# Patient Record
Sex: MALE | Race: WHITE | NOT HISPANIC OR LATINO
[De-identification: names, ages, dates, MRNs, and addresses within clinical notes are randomized per-mention and may not be internally consistent; named-entity substitution may affect disease eponyms.]

---

## 2018-02-16 ENCOUNTER — APPOINTMENT (OUTPATIENT)
Dept: PULMONOLOGY | Facility: CLINIC | Age: 73
End: 2018-02-16
Payer: MEDICARE

## 2018-02-16 VITALS
RESPIRATION RATE: 16 BRPM | TEMPERATURE: 98.1 F | SYSTOLIC BLOOD PRESSURE: 120 MMHG | DIASTOLIC BLOOD PRESSURE: 70 MMHG | HEART RATE: 75 BPM | OXYGEN SATURATION: 95 % | HEIGHT: 67 IN | BODY MASS INDEX: 25.11 KG/M2 | WEIGHT: 160 LBS

## 2018-02-16 DIAGNOSIS — Z00.00 ENCOUNTER FOR GENERAL ADULT MEDICAL EXAMINATION W/OUT ABNORMAL FINDINGS: ICD-10-CM

## 2018-02-16 PROCEDURE — 99205 OFFICE O/P NEW HI 60 MIN: CPT | Mod: 25

## 2018-02-16 PROCEDURE — 94726 PLETHYSMOGRAPHY LUNG VOLUMES: CPT

## 2018-02-16 PROCEDURE — 94060 EVALUATION OF WHEEZING: CPT

## 2018-02-16 PROCEDURE — ZZZZZ: CPT

## 2018-02-16 PROCEDURE — 94729 DIFFUSING CAPACITY: CPT

## 2018-02-16 RX ORDER — OMEPRAZOLE 20 MG/1
20 CAPSULE, DELAYED RELEASE ORAL
Qty: 90 | Refills: 0 | Status: ACTIVE | COMMUNITY
Start: 2017-09-28

## 2018-02-24 ENCOUNTER — OUTPATIENT (OUTPATIENT)
Dept: OUTPATIENT SERVICES | Facility: HOSPITAL | Age: 73
LOS: 1 days | End: 2018-02-24
Payer: MEDICARE

## 2018-02-24 ENCOUNTER — APPOINTMENT (OUTPATIENT)
Dept: CT IMAGING | Facility: IMAGING CENTER | Age: 73
End: 2018-02-24
Payer: MEDICARE

## 2018-02-24 DIAGNOSIS — Z00.8 ENCOUNTER FOR OTHER GENERAL EXAMINATION: ICD-10-CM

## 2018-02-24 PROCEDURE — 71250 CT THORAX DX C-: CPT | Mod: 26

## 2018-02-24 PROCEDURE — 71250 CT THORAX DX C-: CPT

## 2018-03-12 ENCOUNTER — APPOINTMENT (OUTPATIENT)
Dept: PULMONOLOGY | Facility: CLINIC | Age: 73
End: 2018-03-12
Payer: MEDICARE

## 2018-03-12 ENCOUNTER — MEDICATION RENEWAL (OUTPATIENT)
Age: 73
End: 2018-03-12

## 2018-03-12 VITALS
TEMPERATURE: 97.8 F | DIASTOLIC BLOOD PRESSURE: 80 MMHG | BODY MASS INDEX: 25.43 KG/M2 | WEIGHT: 162 LBS | HEART RATE: 69 BPM | HEIGHT: 67 IN | SYSTOLIC BLOOD PRESSURE: 130 MMHG | RESPIRATION RATE: 15 BRPM

## 2018-03-12 DIAGNOSIS — R91.8 OTHER NONSPECIFIC ABNORMAL FINDING OF LUNG FIELD: ICD-10-CM

## 2018-03-12 DIAGNOSIS — Z87.09 PERSONAL HISTORY OF OTHER DISEASES OF THE RESPIRATORY SYSTEM: ICD-10-CM

## 2018-03-12 PROCEDURE — 99215 OFFICE O/P EST HI 40 MIN: CPT

## 2018-03-20 ENCOUNTER — MOBILE ON CALL (OUTPATIENT)
Age: 73
End: 2018-03-20

## 2018-06-16 ENCOUNTER — APPOINTMENT (OUTPATIENT)
Dept: CT IMAGING | Facility: IMAGING CENTER | Age: 73
End: 2018-06-16
Payer: MEDICARE

## 2018-06-16 ENCOUNTER — OUTPATIENT (OUTPATIENT)
Dept: OUTPATIENT SERVICES | Facility: HOSPITAL | Age: 73
LOS: 1 days | End: 2018-06-16
Payer: MEDICARE

## 2018-06-16 DIAGNOSIS — R91.8 OTHER NONSPECIFIC ABNORMAL FINDING OF LUNG FIELD: ICD-10-CM

## 2018-06-16 PROCEDURE — 71250 CT THORAX DX C-: CPT

## 2018-06-16 PROCEDURE — 71250 CT THORAX DX C-: CPT | Mod: 26

## 2018-07-02 ENCOUNTER — APPOINTMENT (OUTPATIENT)
Dept: PULMONOLOGY | Facility: CLINIC | Age: 73
End: 2018-07-02

## 2018-10-01 ENCOUNTER — APPOINTMENT (OUTPATIENT)
Dept: PULMONOLOGY | Facility: CLINIC | Age: 73
End: 2018-10-01

## 2018-10-01 ENCOUNTER — APPOINTMENT (OUTPATIENT)
Dept: PULMONOLOGY | Facility: CLINIC | Age: 73
End: 2018-10-01
Payer: MEDICARE

## 2018-10-01 ENCOUNTER — MED ADMIN CHARGE (OUTPATIENT)
Age: 73
End: 2018-10-01

## 2018-10-01 PROCEDURE — 90662 IIV NO PRSV INCREASED AG IM: CPT

## 2018-10-01 PROCEDURE — G0008: CPT

## 2019-09-09 ENCOUNTER — OUTPATIENT (OUTPATIENT)
Dept: OUTPATIENT SERVICES | Facility: HOSPITAL | Age: 74
LOS: 1 days | End: 2019-09-09
Payer: MEDICARE

## 2019-09-09 ENCOUNTER — APPOINTMENT (OUTPATIENT)
Dept: RADIOLOGY | Facility: CLINIC | Age: 74
End: 2019-09-09
Payer: MEDICARE

## 2019-09-09 DIAGNOSIS — M47.817 SPONDYLOSIS WITHOUT MYELOPATHY OR RADICULOPATHY, LUMBOSACRAL REGION: ICD-10-CM

## 2019-09-09 PROCEDURE — 71100 X-RAY EXAM RIBS UNI 2 VIEWS: CPT | Mod: 26,RT

## 2019-09-09 PROCEDURE — 71100 X-RAY EXAM RIBS UNI 2 VIEWS: CPT

## 2020-10-15 ENCOUNTER — APPOINTMENT (OUTPATIENT)
Dept: PULMONOLOGY | Facility: CLINIC | Age: 75
End: 2020-10-15
Payer: MEDICARE

## 2020-10-15 DIAGNOSIS — Z23 ENCOUNTER FOR IMMUNIZATION: ICD-10-CM

## 2020-10-15 PROCEDURE — G0008: CPT

## 2020-10-15 PROCEDURE — 90662 IIV NO PRSV INCREASED AG IM: CPT

## 2021-05-19 ENCOUNTER — APPOINTMENT (OUTPATIENT)
Dept: ORTHOPEDIC SURGERY | Facility: CLINIC | Age: 76
End: 2021-05-19
Payer: MEDICARE

## 2021-05-19 VITALS — BODY MASS INDEX: 21.62 KG/M2 | HEIGHT: 70 IN | WEIGHT: 151 LBS

## 2021-05-19 DIAGNOSIS — Z78.9 OTHER SPECIFIED HEALTH STATUS: ICD-10-CM

## 2021-05-19 DIAGNOSIS — M48.07 SPINAL STENOSIS, LUMBOSACRAL REGION: ICD-10-CM

## 2021-05-19 PROCEDURE — 99204 OFFICE O/P NEW MOD 45 MIN: CPT

## 2021-05-27 ENCOUNTER — NON-APPOINTMENT (OUTPATIENT)
Age: 76
End: 2021-05-27

## 2021-05-27 ENCOUNTER — APPOINTMENT (OUTPATIENT)
Dept: ORTHOPEDIC SURGERY | Facility: CLINIC | Age: 76
End: 2021-05-27
Payer: MEDICARE

## 2021-05-27 VITALS
BODY MASS INDEX: 21.62 KG/M2 | WEIGHT: 151 LBS | HEIGHT: 70 IN | TEMPERATURE: 98.1 F | SYSTOLIC BLOOD PRESSURE: 143 MMHG | HEART RATE: 71 BPM | DIASTOLIC BLOOD PRESSURE: 82 MMHG

## 2021-05-27 DIAGNOSIS — Z82.61 FAMILY HISTORY OF ARTHRITIS: ICD-10-CM

## 2021-05-27 DIAGNOSIS — Z82.62 FAMILY HISTORY OF OSTEOPOROSIS: ICD-10-CM

## 2021-05-27 DIAGNOSIS — Z87.39 PERSONAL HISTORY OF OTHER DISEASES OF THE MUSCULOSKELETAL SYSTEM AND CONNECTIVE TISSUE: ICD-10-CM

## 2021-05-27 DIAGNOSIS — Z80.9 FAMILY HISTORY OF MALIGNANT NEOPLASM, UNSPECIFIED: ICD-10-CM

## 2021-05-27 PROCEDURE — 73522 X-RAY EXAM HIPS BI 3-4 VIEWS: CPT

## 2021-05-27 PROCEDURE — 99213 OFFICE O/P EST LOW 20 MIN: CPT

## 2021-06-01 ENCOUNTER — RESULT REVIEW (OUTPATIENT)
Age: 76
End: 2021-06-01

## 2021-06-01 ENCOUNTER — APPOINTMENT (OUTPATIENT)
Dept: ULTRASOUND IMAGING | Facility: CLINIC | Age: 76
End: 2021-06-01
Payer: MEDICARE

## 2021-06-01 ENCOUNTER — OUTPATIENT (OUTPATIENT)
Dept: OUTPATIENT SERVICES | Facility: HOSPITAL | Age: 76
LOS: 1 days | End: 2021-06-01
Payer: MEDICARE

## 2021-06-01 DIAGNOSIS — M16.0 BILATERAL PRIMARY OSTEOARTHRITIS OF HIP: ICD-10-CM

## 2021-06-01 PROCEDURE — 20611 DRAIN/INJ JOINT/BURSA W/US: CPT | Mod: LT

## 2021-06-01 PROCEDURE — 20611 DRAIN/INJ JOINT/BURSA W/US: CPT

## 2021-06-03 NOTE — REVIEW OF SYSTEMS
[Negative] : Heme/Lymph [FreeTextEntry9] : Joint pain  [FreeTextEntry2] : Feeling tired [de-identified] : Muscle weakness

## 2021-06-03 NOTE — HISTORY OF PRESENT ILLNESS
[8] : the relief from medicine is 8/10 [(no relief)  0] : the relief from medicine is 0/10 (no relief) [] : Yes [de-identified] : The patient comes in today for his bilateral hips.  He has been referred in for evaluation of his bilateral hips.  He states he has been having some long term back pain.  He did have an injection once in his left hip socket in 2019 with some relief.  He is having persistent complaints of pain but he does not want to proceed with any surgical intervention.  This injury is not work related or due to an automobile accident.  The patient states the pain is constant.  The patient describes the pain as sharp and throbbing.  The patient states nothing makes the pain better while walking, bending, sitting and lying down makes the pain worse.\par \par Pain level includes a current pain level of 9/10. \par \par Ailment Interference:  \par Daily Livin/10\par Normal Work:  0/10\par Sleep:  0/10\par Enjoyment of Life:  0/10\par Climbing Stairs:   0/10\par Sports:   0/10\par Extra-Curricular Activities:   0/10 [de-identified] : Physical therapy  [de-identified] : Steroids (hip) [de-identified] : Steroids (back) [de-identified] : He notes loss of balance.  He notes he fell twice (months apart).

## 2021-06-03 NOTE — ADDENDUM
[FreeTextEntry1] : This note was written by Saida Rosenbaum on 06/02/2021 acting as scribe for Wale Jacobs III, MD

## 2021-06-03 NOTE — PHYSICAL EXAM
[de-identified] : Left Hip: \par Range of Motion in Degrees:\par 	                                  Claimant:	Normal:	\par Flexion (Active) 	                  120 	                120-degrees	\par Flexion (Passive)	                  120	                120-degrees	\par Extension (Active)	                  -30	                -30-degrees	\par Extension (Passive)	  -30	                -30-degrees	\par Abduction (Active)	                  45-50	                01-33-brkfenl	\par Abduction (Passive)	  45-50	                74-66-imxnzoo	\par Adduction (Active)	                  20-30	                14-34-kbadssk	\par Adduction (Passive)	  20-30	                84-30-ikpupii	\par Internal Rotation (Active) 	 10	                35-degrees	\par Internal Rotation (Passive)	 10	                35-degrees	\par External Rotation (Active)	 45	                45-degrees	\par External Rotation (Passive)	 45	                45-degrees	\par \par Tenderness into the groin with internal and external rotation and axial load.  No tenderness to palpation over the greater trochanter.  Negative Trendelenburg.  No tenderness with resisted abduction.  No weakness to flexion, extension, abduction or adduction.  No evidence of instability.  No motor or sensory deficits.  2+ DP and PT pulses.  Skin is intact.  No scars, rashes or lesions.  \par  \par Right Hip: \par Range of Motion in Degrees:\par 	                                  Claimant:	Normal:	\par Flexion (Active) 	                  120 	                120-degrees	\par Flexion (Passive)	                  120	                120-degrees	\par Extension (Active)	                  -30	                -30-degrees	\par Extension (Passive)	  -30	                -30-degrees	\par Abduction (Active)	                  45-50	                81-91-vfclarq	\par Abduction (Passive)	  45-50	                36-18-eohruat	\par Adduction (Active)	                  20-30	                53-98-soyaujy	\par Adduction (Passive)	  20-30	                89-82-uakxcru	\par Internal Rotation (Active) 	 10	                35-degrees	\par Internal Rotation (Passive)	 10	                35-degrees	\par External Rotation (Active)	 45	                45-degrees	\par External Rotation (Passive)	 45	                45-degrees	\par \par Tenderness into the groin with internal and external rotation and axial load.  No tenderness to palpation over the greater trochanter.  Negative Trendelenburg.  No tenderness with resisted abduction.  No weakness to flexion, extension, abduction or adduction.  No evidence of instability.  No motor or sensory deficits.  2+ DP and PT pulses.  Skin is intact.  No scars, rashes or lesions.  \par  [de-identified] : Ambulating with a slightly antalgic to antalgic gait.  Station:  Normal.  [de-identified] : General Appearance:  Well-developed, well-nourished male in no acute distress. \par  [de-identified] : Radiographs, two views of the right hip, two views of the left hip and one view of the pelvis, show end stage arthritis of bilateral hips.

## 2021-06-03 NOTE — DISCUSSION/SUMMARY
[de-identified] : At this time, I had a long discussion with him concerning operative and nonoperative modalities for the bilateral hip osteoarthritis.  At this point, he does not want to proceed with any surgical intervention.  I have therefore offered to refer him in for bilateral intra-articular hip injections.  He will be reassessed once that is done.

## 2021-07-01 ENCOUNTER — APPOINTMENT (OUTPATIENT)
Dept: ORTHOPEDIC SURGERY | Facility: CLINIC | Age: 76
End: 2021-07-01
Payer: MEDICARE

## 2021-07-01 VITALS
HEIGHT: 70 IN | DIASTOLIC BLOOD PRESSURE: 79 MMHG | WEIGHT: 315 LBS | SYSTOLIC BLOOD PRESSURE: 126 MMHG | HEART RATE: 75 BPM | BODY MASS INDEX: 45.1 KG/M2

## 2021-07-01 PROCEDURE — 72170 X-RAY EXAM OF PELVIS: CPT

## 2021-07-01 PROCEDURE — 99213 OFFICE O/P EST LOW 20 MIN: CPT

## 2021-07-06 ENCOUNTER — RESULT REVIEW (OUTPATIENT)
Age: 76
End: 2021-07-06

## 2021-07-06 ENCOUNTER — OUTPATIENT (OUTPATIENT)
Dept: OUTPATIENT SERVICES | Facility: HOSPITAL | Age: 76
LOS: 1 days | End: 2021-07-06
Payer: MEDICARE

## 2021-07-06 ENCOUNTER — APPOINTMENT (OUTPATIENT)
Dept: ULTRASOUND IMAGING | Facility: CLINIC | Age: 76
End: 2021-07-06
Payer: MEDICARE

## 2021-07-06 DIAGNOSIS — Z00.8 ENCOUNTER FOR OTHER GENERAL EXAMINATION: ICD-10-CM

## 2021-07-06 PROCEDURE — 20611 DRAIN/INJ JOINT/BURSA W/US: CPT

## 2021-07-06 PROCEDURE — 20611 DRAIN/INJ JOINT/BURSA W/US: CPT | Mod: RT

## 2021-07-07 NOTE — ADDENDUM
[FreeTextEntry1] : This note was written by Raquel Raza on 07/07/2021 acting as a scribe for EDILBERTO CAMPA III, MD

## 2021-07-07 NOTE — PHYSICAL EXAM
[de-identified] : Left Hip: \par Range of Motion in Degrees:\par 	                                  Claimant:	Normal:	\par Flexion (Active) 	                  120 	                120-degrees	\par Flexion (Passive)	                  120	                120-degrees	\par Extension (Active)	                  -30	                -30-degrees	\par Extension (Passive)	  -30	                -30-degrees	\par Abduction (Active)	                  45-50	                65-57-oxbwvew	\par Abduction (Passive)	  45-50	                95-99-uffnhaz	\par Adduction (Active)	                  20-30	                76-82-oesibhc	\par Adduction (Passive)	  20-30	                50-18-ndpxxlu	\par Internal Rotation (Active) 	  0	                35-degrees	\par Internal Rotation (Passive)	  0	                35-degrees	\par External Rotation (Active)	 45	                45-degrees	\par External Rotation (Passive)	 45	                45-degrees	\par \par Minimal groin pain.  No anterior thigh pain. Tenderness into the groin with internal and external rotation and axial load.  No tenderness to palpation over the greater trochanter.  Negative Trendelenburg.  No tenderness with resisted abduction.  No weakness to flexion, extension, abduction or adduction.  No evidence of instability.  No motor or sensory deficits.  2+ DP and PT pulses.  Skin is intact.  No scars, rashes or lesions.  \par \par Right Hip: \par Range of Motion in Degrees:\par 	                                  Claimant:	Normal:	\par Flexion (Active) 	                  120 	                120-degrees	\par Flexion (Passive)	                  120	                120-degrees	\par Extension (Active)	                  -30	                -30-degrees	\par Extension (Passive)	  -30	                -30-degrees	\par Abduction (Active)	                  45-50	                80-37-yrqudvo	\par Abduction (Passive)	  45-50	                23-33-votblng	\par Adduction (Active)	                  20-30	                75-40-ttbgnqk	\par Adduction (Passive)	  20-30	                05-31-qiujlsr	\par Internal Rotation (Active) 	  0	                35-degrees	\par Internal Rotation (Passive)	  0	                35-degrees	\par External Rotation (Active)	 45	                45-degrees	\par External Rotation (Passive)	 45	                45-degrees	\par \par Pain into the groin.  No anterior thigh pain.  Tenderness into the groin with internal and external rotation and axial load.  No tenderness to palpation over the greater trochanter.  Negative Trendelenburg.  No tenderness with resisted abduction.  No weakness to flexion, extension, abduction or adduction.  No evidence of instability.  No motor or sensory deficits.  2+ DP and PT pulses.  Skin is intact.  No scars, rashes or lesions.  \par    [de-identified] : Ambulating with a slightly antalgic to antalgic gait.  Station:  Normal.  [de-identified] : Appearance:  Well-developed, well-nourished male in no acute distress.\par   [de-identified] : Radiographs, one view of the pelvis, show severe arthritis.

## 2021-07-07 NOTE — HISTORY OF PRESENT ILLNESS
[de-identified] : The patient comes in today for his bilateral hips.  He has seen Dr. Baldwin.  His only complaints right now is some groin pain on the right and some anterior thigh pain.

## 2021-07-07 NOTE — DISCUSSION/SUMMARY
[de-identified] : At this time, due to osteoarthritis of the bilateral hips and possible spinal stenosis vs. femoral radiculopathy, I recommend intra-articular injection on the right hip.  I advised him that ultimately he is going to need a hip arthroplasty, but he seems to have responded pretty well to the left intra-articular injection.  He will be reassessed in three to four weeks.  He will follow up with a spine surgeon.

## 2021-10-15 ENCOUNTER — APPOINTMENT (OUTPATIENT)
Dept: PULMONOLOGY | Facility: CLINIC | Age: 76
End: 2021-10-15
Payer: MEDICARE

## 2021-10-15 PROCEDURE — 90662 IIV NO PRSV INCREASED AG IM: CPT

## 2021-10-15 PROCEDURE — G0008: CPT

## 2021-11-03 ENCOUNTER — APPOINTMENT (OUTPATIENT)
Dept: CARDIOLOGY | Facility: CLINIC | Age: 76
End: 2021-11-03
Payer: MEDICARE

## 2021-11-03 VITALS
WEIGHT: 156 LBS | DIASTOLIC BLOOD PRESSURE: 81 MMHG | HEIGHT: 70 IN | OXYGEN SATURATION: 97 % | HEART RATE: 81 BPM | SYSTOLIC BLOOD PRESSURE: 142 MMHG | BODY MASS INDEX: 22.33 KG/M2

## 2021-11-03 DIAGNOSIS — M16.0 BILATERAL PRIMARY OSTEOARTHRITIS OF HIP: ICD-10-CM

## 2021-11-03 DIAGNOSIS — Z87.891 PERSONAL HISTORY OF NICOTINE DEPENDENCE: ICD-10-CM

## 2021-11-03 DIAGNOSIS — Z01.818 ENCOUNTER FOR OTHER PREPROCEDURAL EXAMINATION: ICD-10-CM

## 2021-11-03 PROCEDURE — 99204 OFFICE O/P NEW MOD 45 MIN: CPT

## 2021-11-04 ENCOUNTER — APPOINTMENT (OUTPATIENT)
Dept: CARDIOLOGY | Facility: CLINIC | Age: 76
End: 2021-11-04
Payer: MEDICARE

## 2021-11-04 PROCEDURE — 93306 TTE W/DOPPLER COMPLETE: CPT

## 2021-11-05 ENCOUNTER — APPOINTMENT (OUTPATIENT)
Dept: CARDIOLOGY | Facility: CLINIC | Age: 76
End: 2021-11-05
Payer: MEDICARE

## 2021-11-05 PROCEDURE — 93015 CV STRESS TEST SUPVJ I&R: CPT

## 2021-11-05 PROCEDURE — 78452 HT MUSCLE IMAGE SPECT MULT: CPT

## 2021-11-05 PROCEDURE — A9500: CPT

## 2021-11-08 NOTE — REASON FOR VISIT
[Arrhythmia/ECG Abnorrmalities] : arrhythmia/ECG abnormalities [Other: ____] : [unfilled] [FreeTextEntry1] : I saw Johnie Sutton in the office today for cardiac evaluation.  He is scheduled for left hip replacement surgery next week.  Preop ECG showed a new right bundle branch block compared to a tracing from 2012.  The patient has no known history of heart disease.  He had a cardiac work-up in the past he thinks about 10 years ago that was unremarkable.\par \par He denies any history of hypertension, diabetes, or hyperlipidemia.  He has no family history of heart disease.  He did smoke cigars but stopped in the late 1970s.  The for the past 2 years has been relatively sedentary because of his hip pain and now walks with a cane.  He has no chest pain, shortness of breath, palpitation.\par \par He is on no prescription medication and denies any history of prior surgery.  He does have chronic back pain will eventually require surgery on the lumbar spine.\par \par Preop ECG demonstrates sinus rhythm with a right bundle branch block and left axis deviation.

## 2021-11-08 NOTE — ADDENDUM
[FreeTextEntry1] : Chemical nuclear stress test 11/5/21 was normal without ischemia. EF 58%.  Echocardiogram 11/4/21 demonstrated an EF 62%. LVH, stage I diastolic dysfunction. Mlid MR.\par \par His cardiac status is stable. No cardiac contraindications to planned hip replacement surgery.

## 2021-11-08 NOTE — REVIEW OF SYSTEMS
[Feeling Fatigued] : feeling fatigued [Constipation] : constipation [Joint Pain] : joint pain [Hip Pain] : hip pain [Lower Back Pain] : lower back pain [Negative] : Genitourinary [Rash] : no rash [Dizziness] : no dizziness [Depression] : no depression [Anxiety] : no anxiety [Easy Bleeding] : no tendency for easy bleeding [Easy Bruising] : no tendency for easy bruising

## 2021-11-08 NOTE — DISCUSSION/SUMMARY
[FreeTextEntry1] : This is a 76-year-old white male with no significant risk factors for heart disease other than a remote history of cigar smoking.  He is on no prescription medication, and has had no prior surgery.  He is relatively sedentary and has difficulty walking because of bilateral hip arthritis and lower lumbar spine pain.  He denies any chest pain, or shortness of breath.\par \par Preop ECG demonstrates a right bundle branch block which is new compared to a tracing from 2012.  Most likely this is due to aging and is an electrical blockage.  Prior to surgery the patient needs an echocardiogram to make sure there is no structural heart disease and a stress test to determine any underlying coronary disease.  Since he cannot walk we would do a chemical nuclear stress test.\par \par These tests will be scheduled to soon as possible.  Assuming the tests show no significant problem the patient represents a satisfactory candidate for the planned hip replacement surgery.  No special precautions other than routine hemodynamic monitoring should be required.  Will notify your office as soon as these tests are completed and the results are available.\par \par This was discussed with the patient and his wife, and I answered all of his questions.

## 2022-05-17 ENCOUNTER — APPOINTMENT (OUTPATIENT)
Dept: ORTHOPEDIC SURGERY | Facility: CLINIC | Age: 77
End: 2022-05-17
Payer: MEDICARE

## 2022-05-17 VITALS — HEIGHT: 70 IN | WEIGHT: 150 LBS | BODY MASS INDEX: 21.47 KG/M2

## 2022-05-17 PROCEDURE — 99213 OFFICE O/P EST LOW 20 MIN: CPT

## 2022-05-17 PROCEDURE — 73521 X-RAY EXAM HIPS BI 2 VIEWS: CPT | Mod: FY

## 2022-05-17 NOTE — HISTORY OF PRESENT ILLNESS
[de-identified] : Patient reports sleeping well, reports infrequent sorneness over left hip. Reports lower back pain and problems that is limiting.

## 2022-05-17 NOTE — ASSESSMENT
[FreeTextEntry1] : Discussion was had in regards to HEP that will help with lower back pain, hamstring tightness and hip mobility. Patient is scheduled for right total hip and will follow up accordingly after surgery. \par \par

## 2022-05-17 NOTE — PHYSICAL EXAM
[Normal Mood and Affect] : normal mood and affect [Orientated] : orientated [Able to Communicate] : able to communicate [Well Nourished] : well nourished [5___] : adduction 5[unfilled]/5 [Left] : left hip with pelvis [All Views] : anteroposterior, lateral [Components well fixed, in good position] : Components well fixed, in good position [Right] : right hip with pelvis [AP] : anteroposterior [Lateral] : lateral [Severe arthritis (Tonnis Grade 3)] : Severe arthritis (Tonnis Grade 3) [] : no palpable masses [FreeTextEntry3] : Flex 100*, IR 40* EXT 50* ABD 50*  [FreeTextEntry8] : Flex 90*, IR 30* EXT 40* ABD 30*\par  [de-identified] : L [FreeTextEntry9] : Bone on Bone superolaterally, osteophytes, cysts in femoral head and sclerosis

## 2022-05-17 NOTE — REASON FOR VISIT
[FreeTextEntry2] : here today for f/u left ARY 11/10/21.  doing well no complaints.   patient is scheduled for 6/22/22 right ARY  c/o right hip getting worse\par

## 2022-06-14 ENCOUNTER — APPOINTMENT (OUTPATIENT)
Dept: CARDIOLOGY | Facility: CLINIC | Age: 77
End: 2022-06-14
Payer: MEDICARE

## 2022-06-14 VITALS
OXYGEN SATURATION: 98 % | WEIGHT: 154 LBS | SYSTOLIC BLOOD PRESSURE: 132 MMHG | DIASTOLIC BLOOD PRESSURE: 82 MMHG | BODY MASS INDEX: 22.05 KG/M2 | HEIGHT: 70 IN | HEART RATE: 69 BPM

## 2022-06-14 PROCEDURE — 99214 OFFICE O/P EST MOD 30 MIN: CPT

## 2022-06-14 NOTE — DISCUSSION/SUMMARY
[FreeTextEntry1] : Clinically the patient is doing well.  He has good functional status without chest pain, shortness of breath, palpitation.  Does have a right bundle branch block.  Underwent cardiac evaluation 11/21 with a normal chemical nuclear stress test and echocardiogram that is consistent with his age.  He has normal ejection fraction without any significant valvular heart disease.\par \par The patient's cardiac status is stable and he represents a satisfactory candidate for the planned right hip replacement surgery.  No special precautions other than routine hemodynamic monitoring should be required.  I will get a copy of his presurgical records from [facility].

## 2022-06-14 NOTE — PHYSICAL EXAM
[Not Palpable] : not palpable [Normal Rate] : normal [Normal S1] : normal S1 [Normal S2] : normal S2 [No Murmur] : no murmurs heard [No Pitting Edema] : no pitting edema present [2+] : left 2+ [1+] : left 1+ [No Abnormalities] : the abdominal aorta was not enlarged and no bruit was heard [Normal] : clear lung fields, good air entry, no respiratory distress [Soft] : abdomen soft [Non Tender] : non-tender [No Edema] : no edema [No Rash] : no rash [Moves all extremities] : moves all extremities [No Focal Deficits] : no focal deficits [Normal Speech] : normal speech [Alert and Oriented] : alert and oriented [Normal memory] : normal memory [S3] : no S3 [S4] : no S4 [Right Carotid Bruit] : no bruit heard over the right carotid [Left Carotid Bruit] : no bruit heard over the left carotid [Right Femoral Bruit] : no bruit heard over the right femoral artery [Left Femoral Bruit] : no bruit heard over the left femoral artery [de-identified] : Walks with a cane

## 2022-06-14 NOTE — HISTORY OF PRESENT ILLNESS
[FreeTextEntry1] : I saw Johnie Sutton in the office today for cardiac follow up' in anticipation of right hip replacement surgery.  I had seen him previously prior to his left hip replacement which went well..  He  a new right bundle branch block noted on ECG compared to 2012..  The patient has no known history of heart disease.  He had a cardiac work-up in the past he thinks about 10 years ago that was unremarkable.\par \par He underwent a chemical nuclear stress test 11/21 that showed no ischemia.  EF 68%.  Echocardiogram demonstrated ejection fraction of 62%.  There was mild TR without pulmonary hypertension.  LVH with stage I diastolic dysfunction.\par \par He denies any history of hypertension, diabetes, or hyperlipidemia.  He has no family history of heart disease.  He did smoke cigars but stopped in the late 1970s.  The for the past 2 years has been relatively sedentary because of his hip pain.  Since his left hip replacement he is walking better and  has no chest pain, shortness of breath, palpitation.\par

## 2022-06-14 NOTE — CARDIOLOGY SUMMARY
[de-identified] : 11/21-RSR, RBBB, LAD [de-identified] : 11/21-Chem- no ischemia, EF 68% [de-identified] : 11/21  EF 62%, mild TR, PAP=26, LVH, I DD

## 2022-06-22 ENCOUNTER — APPOINTMENT (OUTPATIENT)
Dept: ORTHOPEDIC SURGERY | Facility: HOSPITAL | Age: 77
End: 2022-06-22
Payer: MEDICARE

## 2022-06-22 PROCEDURE — 27130 TOTAL HIP ARTHROPLASTY: CPT | Mod: AS,RT

## 2022-06-22 PROCEDURE — 27130 TOTAL HIP ARTHROPLASTY: CPT

## 2022-07-05 ENCOUNTER — APPOINTMENT (OUTPATIENT)
Dept: ORTHOPEDIC SURGERY | Facility: CLINIC | Age: 77
End: 2022-07-05

## 2022-07-05 DIAGNOSIS — Z96.642 PRESENCE OF LEFT ARTIFICIAL HIP JOINT: ICD-10-CM

## 2022-07-05 PROCEDURE — 73502 X-RAY EXAM HIP UNI 2-3 VIEWS: CPT | Mod: RT

## 2022-07-05 PROCEDURE — 99024 POSTOP FOLLOW-UP VISIT: CPT

## 2022-07-05 NOTE — HISTORY OF PRESENT ILLNESS
[de-identified] : Patient reports sleeping well, reports infrequent sorneness over left hip. Reports lower back pain and problems that is limiting.

## 2022-07-05 NOTE — REASON FOR VISIT
[FreeTextEntry2] : Patient had right hip replaced 06/22/2022. Patient is taking hydrocodone. Patient admits pain is 4/10. Fell on Sunday, experiencing some pain. Difficulty sleeping.

## 2022-07-05 NOTE — PHYSICAL EXAM
[Normal Mood and Affect] : normal mood and affect [Orientated] : orientated [Able to Communicate] : able to communicate [Well Nourished] : well nourished [Left] : left hip [All Views] : anteroposterior, lateral [Components well fixed, in good position] : Components well fixed, in good position [AP] : anteroposterior [Lateral] : lateral [Severe arthritis (Tonnis Grade 3)] : Severe arthritis (Tonnis Grade 3) [5___] : adduction 5[unfilled]/5 [] : uses walker [Right] : right hip with pelvis [FreeTextEntry8] : Flex 90*, IR 30* EXT 40* ABD 30*\par  [FreeTextEntry9] : Bone on Bone superolaterally, osteophytes, cysts in femoral head and sclerosis

## 2022-07-05 NOTE — ASSESSMENT
[FreeTextEntry1] : Patient is healing and recovering well. No signs of displacement of prosthesis in right hip replacement. Continue PT and HEP/Medication. Follow up in one month

## 2022-07-14 ENCOUNTER — FORM ENCOUNTER (OUTPATIENT)
Age: 77
End: 2022-07-14

## 2022-08-02 ENCOUNTER — APPOINTMENT (OUTPATIENT)
Dept: ORTHOPEDIC SURGERY | Facility: CLINIC | Age: 77
End: 2022-08-02

## 2022-08-02 VITALS — BODY MASS INDEX: 22.05 KG/M2 | HEIGHT: 70 IN | WEIGHT: 154 LBS

## 2022-08-02 PROCEDURE — 99024 POSTOP FOLLOW-UP VISIT: CPT

## 2022-08-02 PROCEDURE — 73502 X-RAY EXAM HIP UNI 2-3 VIEWS: CPT | Mod: RT

## 2022-08-02 NOTE — REASON FOR VISIT
[FreeTextEntry2] : here today for f/u right ARY 6/22/22. using cane for ambulation, no pain meds, going to OPPT. pain 5/10

## 2022-08-02 NOTE — PHYSICAL EXAM
[Normal Mood and Affect] : normal mood and affect [Orientated] : orientated [Able to Communicate] : able to communicate [Well Nourished] : well nourished [5___] : adduction 5[unfilled]/5 [All Views] : anteroposterior, lateral [Components well fixed, in good position] : Components well fixed, in good position [Right] : right hip with pelvis [AP] : anteroposterior [Lateral] : lateral [Severe arthritis (Tonnis Grade 3)] : Severe arthritis (Tonnis Grade 3) [] : no tenderness [FreeTextEntry8] : \par  [FreeTextEntry9] : Flex 90 * IR 40 * EXT 40 * ABD 35 *

## 2022-08-02 NOTE — DISCUSSION/SUMMARY
[de-identified] : Patient is progressing well. Will continue PT at this time.\par \par f/u 6 weeks

## 2022-08-17 ENCOUNTER — FORM ENCOUNTER (OUTPATIENT)
Age: 77
End: 2022-08-17

## 2022-09-13 ENCOUNTER — APPOINTMENT (OUTPATIENT)
Dept: ORTHOPEDIC SURGERY | Facility: CLINIC | Age: 77
End: 2022-09-13

## 2022-09-13 VITALS — HEIGHT: 70 IN | BODY MASS INDEX: 22.05 KG/M2 | WEIGHT: 154 LBS

## 2022-09-13 DIAGNOSIS — M16.11 UNILATERAL PRIMARY OSTEOARTHRITIS, RIGHT HIP: ICD-10-CM

## 2022-09-13 PROCEDURE — 99024 POSTOP FOLLOW-UP VISIT: CPT

## 2022-09-13 PROCEDURE — 73502 X-RAY EXAM HIP UNI 2-3 VIEWS: CPT | Mod: RT

## 2022-09-13 NOTE — REASON FOR VISIT
[FreeTextEntry2] : here today for f/u right ARY 6/22/22.  no assistive device.  still going to PT.. minimal pain

## 2022-09-13 NOTE — PHYSICAL EXAM
[4___] : flexion 4[unfilled]/5 [5___] : adduction 5[unfilled]/5 [1+] : dorsalis pedis pulse: 1+ [Hip flexion] : hip flexion [] : patient ambulates without assistive device [Right] : right hip with pelvis [AP] : anteroposterior [Lateral] : lateral [There are no fractures, subluxations or dislocations. No significant abnormalities are seen] : There are no fractures, subluxations or dislocations. No significant abnormalities are seen [Components well fixed, in good position] : Components well fixed, in good position [FreeTextEntry9] : ER 60 [TWNoteComboBox7] : flexion 100 degrees [de-identified] : extension 15 degrees [de-identified] : adduction 40 degrees [TWNoteComboBox6] : internal rotation 40 degrees

## 2022-09-26 ENCOUNTER — FORM ENCOUNTER (OUTPATIENT)
Age: 77
End: 2022-09-26

## 2022-10-07 ENCOUNTER — APPOINTMENT (OUTPATIENT)
Dept: PULMONOLOGY | Facility: CLINIC | Age: 77
End: 2022-10-07

## 2022-10-07 VITALS
OXYGEN SATURATION: 97 % | RESPIRATION RATE: 15 BRPM | HEART RATE: 73 BPM | BODY MASS INDEX: 21.33 KG/M2 | DIASTOLIC BLOOD PRESSURE: 77 MMHG | SYSTOLIC BLOOD PRESSURE: 129 MMHG | TEMPERATURE: 98.1 F | WEIGHT: 149 LBS | HEIGHT: 70 IN

## 2022-10-07 PROCEDURE — 90662 IIV NO PRSV INCREASED AG IM: CPT

## 2022-10-07 PROCEDURE — G0008: CPT

## 2022-10-07 PROCEDURE — 99215 OFFICE O/P EST HI 40 MIN: CPT | Mod: 25

## 2022-10-07 NOTE — DISCUSSION/SUMMARY
[FreeTextEntry1] : ------------Assessment plan---------- patient has been referred here for further opinion regarding pulmonary problem-------75yo referred for eval of ?COPD- PFT normal.---he has positive smoking---\par \par His CT chest is not consistent w/ COPD but he does have ground glass opacities and tree in bud opacities-\par repeat CT chest \par labs in our office today\par influenza vac given today\par f/u in 3-4m\par \par \par Thanks for allowing  me to participate  in the care of this patient.  Patient at this time  will follow  the above mentioned recommendations and return back for follow up visit. If you have any questions  I can be reached  at [phone]  or  [phone].  –\par \par Dixie Deluna MD, FCCP \par Director, Pulmonary Hypertension Program \par [facility] \par Division of Pulmonary, Critical Care and Sleep Medicine \par  Professor of Medicine \par [employer]\par \par [facility]\par \par \par

## 2022-10-07 NOTE — PHYSICAL EXAM
[General Appearance - Well Developed] : well developed [Normal Appearance] : normal appearance [Well Groomed] : well groomed [General Appearance - Well Nourished] : well nourished [No Deformities] : no deformities [General Appearance - In No Acute Distress] : no acute distress [Normal Conjunctiva] : the conjunctiva exhibited no abnormalities [Eyelids - No Xanthelasma] : the eyelids demonstrated no xanthelasmas [Normal Oropharynx] : normal oropharynx [Neck Appearance] : the appearance of the neck was normal [Neck Cervical Mass (___cm)] : no neck mass was observed [Jugular Venous Distention Increased] : there was no jugular-venous distention [Thyroid Diffuse Enlargement] : the thyroid was not enlarged [Thyroid Nodule] : there were no palpable thyroid nodules [Heart Rate And Rhythm] : heart rate and rhythm were normal [Heart Sounds] : normal S1 and S2 [Murmurs] : no murmurs present [Respiration, Rhythm And Depth] : normal respiratory rhythm and effort [Exaggerated Use Of Accessory Muscles For Inspiration] : no accessory muscle use [Auscultation Breath Sounds / Voice Sounds] : lungs were clear to auscultation bilaterally [Tenderness: ____] : tenderness [unfilled] [Abdomen Soft] : soft [Abdomen Tenderness] : non-tender [Abdomen Mass (___ Cm)] : no abdominal mass palpated [Abnormal Walk] : normal gait [Gait - Sufficient For Exercise Testing] : the gait was sufficient for exercise testing [Nail Clubbing] : no clubbing of the fingernails [Cyanosis, Localized] : no localized cyanosis [Petechial Hemorrhages (___cm)] : no petechial hemorrhages [No Venous Stasis] : no venous stasis [Skin Lesions] : no skin lesions [No Skin Ulcers] : no skin ulcer [No Xanthoma] : no  xanthoma was observed [Deep Tendon Reflexes (DTR)] : deep tendon reflexes were 2+ and symmetric [Sensation] : the sensory exam was normal to light touch and pinprick [No Focal Deficits] : no focal deficits [Oriented To Time, Place, And Person] : oriented to person, place, and time [Impaired Insight] : insight and judgment were intact [Affect] : the affect was normal [Skin Color & Pigmentation] : normal skin color and pigmentation [Skin Turgor] : normal skin turgor [] : no rash

## 2022-10-07 NOTE — HISTORY OF PRESENT ILLNESS
[FreeTextEntry1] : This letter  is regarding your patient  who  attended pulmonary out patient office today.  I have reviewed  patient's  past history, social history, family history and medication list. I also  reviewed nurse practitioners/ and fellows  notes and assessment and agree with it.  –The patient was referred by Dr Gong- had URI and CXR was c/w COPD- former smoker- quit 35yrs ago. Pt is asymptomatic\par ------------------------------No history of , fever, chills , rigors, chestpain, or hemoptysis. Questionable history of Raynauds phenomenon. No h/o significant weight loss in last few months. No history of liver dysfunction , collagen vascular disorder or chronic thromboembolic disease. I would classify her dyspnea as WHO  FUNCTIONAL CLASS II--------\par ------\par ----Pft date-------2/16/18 normal lung volumes--\par ----Ct scan date--2/24/18 IMPRESSION: Right upper lobe dominant mixed solid and groundglass opacity \par with other subcentimeter bilateral nodular opacities as above are likely \par infectious etiology and represent pneumonia. Clinical correlation for \par chronic mycobacterial avium complex infection is recommended given the \par distribution of some of the tree-in-bud opacities. 1 to 3 month follow-up \par chest CT is recommended to ensure resolution. \par \par Coronary artery atherosclerotic disease. \par ---\par -3/2018 here to review CT chest- he has no current resp complaints--------\par \par \par 10/2022 copd- cough - c/o wt loss

## 2022-10-07 NOTE — END OF VISIT
[] : Nurse Practitioner [Time Spent: ___ minutes] : I have spent [unfilled] minutes of time on the encounter. [FreeTextEntry1] : at

## 2022-10-09 ENCOUNTER — NON-APPOINTMENT (OUTPATIENT)
Age: 77
End: 2022-10-09

## 2022-10-10 LAB
AFP-TM SERPL-MCNC: <1.8 NG/ML
ALBUMIN SERPL ELPH-MCNC: 4.6 G/DL
ALP BLD-CCNC: 69 U/L
ALT SERPL-CCNC: 18 U/L
ANION GAP SERPL CALC-SCNC: 12 MMOL/L
AST SERPL-CCNC: 22 U/L
BASOPHILS # BLD AUTO: 0.06 K/UL
BASOPHILS NFR BLD AUTO: 1 %
BILIRUB SERPL-MCNC: 0.4 MG/DL
BUN SERPL-MCNC: 19 MG/DL
CALCIUM SERPL-MCNC: 9.9 MG/DL
CANCER AG125 SERPL-ACNC: 12 U/ML
CEA SERPL-MCNC: 2.4 NG/ML
CHLORIDE SERPL-SCNC: 105 MMOL/L
CO2 SERPL-SCNC: 25 MMOL/L
CREAT SERPL-MCNC: 0.99 MG/DL
EGFR: 79 ML/MIN/1.73M2
EOSINOPHIL # BLD AUTO: 0.16 K/UL
EOSINOPHIL NFR BLD AUTO: 2.5 %
FERRITIN SERPL-MCNC: 52 NG/ML
GLUCOSE SERPL-MCNC: 88 MG/DL
HCT VFR BLD CALC: 40.7 %
HGB BLD-MCNC: 13 G/DL
IMM GRANULOCYTES NFR BLD AUTO: 0.5 %
LYMPHOCYTES # BLD AUTO: 1.33 K/UL
LYMPHOCYTES NFR BLD AUTO: 21.2 %
MAN DIFF?: NORMAL
MCHC RBC-ENTMCNC: 29.3 PG
MCHC RBC-ENTMCNC: 31.9 GM/DL
MCV RBC AUTO: 91.7 FL
MONOCYTES # BLD AUTO: 0.64 K/UL
MONOCYTES NFR BLD AUTO: 10.2 %
NEUTROPHILS # BLD AUTO: 4.06 K/UL
NEUTROPHILS NFR BLD AUTO: 64.6 %
PLATELET # BLD AUTO: 389 K/UL
POTASSIUM SERPL-SCNC: 4.9 MMOL/L
PROT SERPL-MCNC: 7 G/DL
PSA FREE FLD-MCNC: 39 %
PSA FREE SERPL-MCNC: 4.47 NG/ML
PSA SERPL-MCNC: 11.4 NG/ML
RBC # BLD: 4.44 M/UL
RBC # FLD: 14.2 %
SODIUM SERPL-SCNC: 142 MMOL/L
TSH SERPL-ACNC: 5.12 UIU/ML
WBC # FLD AUTO: 6.28 K/UL

## 2022-10-10 RX ORDER — LEVOTHYROXINE SODIUM 0.03 MG/1
25 TABLET ORAL DAILY
Qty: 30 | Refills: 0 | Status: ACTIVE | COMMUNITY
Start: 2022-10-10 | End: 1900-01-01

## 2022-10-13 ENCOUNTER — OUTPATIENT (OUTPATIENT)
Dept: OUTPATIENT SERVICES | Facility: HOSPITAL | Age: 77
LOS: 1 days | End: 2022-10-13
Payer: MEDICARE

## 2022-10-13 ENCOUNTER — APPOINTMENT (OUTPATIENT)
Dept: CT IMAGING | Facility: CLINIC | Age: 77
End: 2022-10-13

## 2022-10-13 DIAGNOSIS — R05.9 COUGH, UNSPECIFIED: ICD-10-CM

## 2022-10-13 PROCEDURE — 71250 CT THORAX DX C-: CPT

## 2022-10-13 PROCEDURE — 71250 CT THORAX DX C-: CPT | Mod: 26,MH

## 2022-10-17 ENCOUNTER — NON-APPOINTMENT (OUTPATIENT)
Age: 77
End: 2022-10-17

## 2022-10-24 ENCOUNTER — APPOINTMENT (OUTPATIENT)
Dept: UROLOGY | Facility: CLINIC | Age: 77
End: 2022-10-24

## 2022-10-24 ENCOUNTER — NON-APPOINTMENT (OUTPATIENT)
Age: 77
End: 2022-10-24

## 2022-10-24 VITALS
BODY MASS INDEX: 20.9 KG/M2 | DIASTOLIC BLOOD PRESSURE: 70 MMHG | TEMPERATURE: 96.4 F | WEIGHT: 146 LBS | SYSTOLIC BLOOD PRESSURE: 122 MMHG | HEART RATE: 75 BPM | HEIGHT: 70 IN | OXYGEN SATURATION: 97 %

## 2022-10-24 LAB
BILIRUB UR QL STRIP: NEGATIVE
CLARITY UR: CLEAR
COLLECTION METHOD: NORMAL
GLUCOSE UR-MCNC: NEGATIVE
HCG UR QL: 0.2 EU/DL
HGB UR QL STRIP.AUTO: NEGATIVE
KETONES UR-MCNC: NEGATIVE
LEUKOCYTE ESTERASE UR QL STRIP: NEGATIVE
NITRITE UR QL STRIP: NEGATIVE
PH UR STRIP: 5.5
PROT UR STRIP-MCNC: NEGATIVE
SP GR UR STRIP: 1.03

## 2022-10-24 PROCEDURE — 99204 OFFICE O/P NEW MOD 45 MIN: CPT

## 2022-10-24 NOTE — ASSESSMENT
[FreeTextEntry1] : 76 year old male with elevated PSA of 11.40,free PSA 39%. recent catheterization post dual hip replacement. has recent unintentional weight loss of 11 lbs.\par No Family History of prostate cancer. \par Had PBx 10 years ago: negative\par exsmoker, no blood thinners.\par \par Plan: r/o BPH elev, r/o PCa\par -UA UCx\par -PSA F/T\par -Obtain MRI of Prostate-(Precision trial) \par \par Precision Trial: 500 men w/ elevated PSA underwent randomization to either MRI +/- MRI guided Bx if suspicion on MRI vs Standard TRUS 12 core Bx-Primary outcome was detection of clinically significant Cancer. Results-71/252 men in MRI group did not undergo biopsy bcs MRI not suggestive; 95/252 who underwent biopsy had clinically significant cancer detected (38%) copared with just 26% in TRUS --> Take home message: the use of MRI and MRI Bx is superior to TRUS in men who have not undergone previous bx because you had higher detection of clinically significant cancer with MRI and less detection and diagnosis of clinically insignificant PCa with MRI, thus saving patients an unnecessary Bx.\par \par I had a discussion with the patient regarding the implication of an elevated PSA and the need for further evaluation with a multiparametric MRI of the prostate with 3D mapping to facilitate subsequent fusion biopsy.  \par \par If suspicious areas are noted, the patient will need a Uronav (meaning fusion MRI-US biopsies) to biopsy the prostate using the transperineal approach. \par \par \par

## 2022-10-24 NOTE — PHYSICAL EXAM
[Edema] : no peripheral edema [] : no respiratory distress [Abdomen Soft] : soft [Urinary Bladder Findings] : the bladder was normal on palpation [Normal Station and Gait] : the gait and station were normal for the patient's age [Skin Color & Pigmentation] : normal skin color and pigmentation [No Focal Deficits] : no focal deficits [Oriented To Time, Place, And Person] : oriented to person, place, and time [Not Anxious] : not anxious [FreeTextEntry1] : PARVEZ: 4x4 smooth

## 2022-10-24 NOTE — HISTORY OF PRESENT ILLNESS
[FreeTextEntry1] : 76 year old male with recently diagnosed elevated PSA of 11.40 ng/ml, free PSA 39%. last PSA 5.2 around 10 years ago.recently underwent bilat hip replacement in the past 7 months. has been losing weight recently, unintentionally, 11 lbs. no bone pain. no hematuria.to note, after hip replacement, he had catheter placed. r/o inflamm elevation in PSA\par Denies family history of prostate cancer.\par Denies maternal history of breast cancer. \par had prostate biopsy 10 years ago: negative per patient.\par Endorses great stream quality. \par IPSS = 0\par MAHNAZ = 3\par Does not take 5 alpha reductase inhibitor. Has never had prostate imaging.  Denies back pain, bone pain, weight loss.\par exsmoker: 10 years\par no blood thinners

## 2022-10-24 NOTE — LETTER BODY
[Dear  ___] : Dear  [unfilled], [Consult Letter:] : I had the pleasure of evaluating your patient, [unfilled]. [Please see my note below.] : Please see my note below. [Consult Closing:] : Thank you very much for allowing me to participate in the care of this patient.  If you have any questions, please do not hesitate to contact me. [Sincerely,] : Sincerely, [FreeTextEntry3] : Shayy Cardona MD\par Urologic Oncology\par [facility]\par [facility] [place] [facility] at [place]\par [facility]\par

## 2022-10-25 LAB
APPEARANCE: CLEAR
BILIRUBIN URINE: NEGATIVE
BLOOD URINE: NEGATIVE
COLOR: YELLOW
GLUCOSE QUALITATIVE U: NEGATIVE
KETONES URINE: NEGATIVE
LEUKOCYTE ESTERASE URINE: NEGATIVE
NITRITE URINE: NEGATIVE
PH URINE: 5.5
PROTEIN URINE: NORMAL
SPECIFIC GRAVITY URINE: 1.03
UROBILINOGEN URINE: NORMAL

## 2022-10-31 LAB
PSA FREE FLD-MCNC: 40 %
PSA FREE SERPL-MCNC: 4.51 NG/ML
PSA SERPL-MCNC: 11.2 NG/ML

## 2022-11-03 ENCOUNTER — OUTPATIENT (OUTPATIENT)
Dept: OUTPATIENT SERVICES | Facility: HOSPITAL | Age: 77
LOS: 1 days | End: 2022-11-03
Payer: MEDICARE

## 2022-11-03 ENCOUNTER — APPOINTMENT (OUTPATIENT)
Dept: MRI IMAGING | Facility: CLINIC | Age: 77
End: 2022-11-03
Payer: MEDICARE

## 2022-11-03 ENCOUNTER — RESULT REVIEW (OUTPATIENT)
Age: 77
End: 2022-11-03

## 2022-11-03 DIAGNOSIS — R97.20 ELEVATED PROSTATE SPECIFIC ANTIGEN [PSA]: ICD-10-CM

## 2022-11-03 DIAGNOSIS — N40.0 BENIGN PROSTATIC HYPERPLASIA WITHOUT LOWER URINARY TRACT SYMPTOMS: ICD-10-CM

## 2022-11-03 PROCEDURE — A9585: CPT

## 2022-11-03 PROCEDURE — 72197 MRI PELVIS W/O & W/DYE: CPT | Mod: 26,MH

## 2022-11-03 PROCEDURE — 72197 MRI PELVIS W/O & W/DYE: CPT

## 2022-11-03 PROCEDURE — 76498 UNLISTED MR PROCEDURE: CPT

## 2022-11-03 PROCEDURE — 76498P: CUSTOM | Mod: 26,MH

## 2022-11-07 ENCOUNTER — APPOINTMENT (OUTPATIENT)
Dept: UROLOGY | Facility: CLINIC | Age: 77
End: 2022-11-07

## 2022-11-07 VITALS
HEIGHT: 70 IN | TEMPERATURE: 96.4 F | DIASTOLIC BLOOD PRESSURE: 65 MMHG | WEIGHT: 146 LBS | SYSTOLIC BLOOD PRESSURE: 109 MMHG | OXYGEN SATURATION: 98 % | BODY MASS INDEX: 20.9 KG/M2 | HEART RATE: 73 BPM

## 2022-11-07 PROCEDURE — 99214 OFFICE O/P EST MOD 30 MIN: CPT

## 2022-11-07 NOTE — PHYSICAL EXAM
[Abdomen Soft] : soft [Urinary Bladder Findings] : the bladder was normal on palpation [Skin Color & Pigmentation] : normal skin color and pigmentation [Edema] : no peripheral edema [] : no respiratory distress [Oriented To Time, Place, And Person] : oriented to person, place, and time [Not Anxious] : not anxious [Normal Station and Gait] : the gait and station were normal for the patient's age [No Focal Deficits] : no focal deficits [FreeTextEntry1] : PARVEZ: 4x4 smooth

## 2022-11-07 NOTE — HISTORY OF PRESENT ILLNESS
[FreeTextEntry1] : 76 year old male with recently diagnosed elevated PSA of 11.40 ng/ml, free PSA 39%. last PSA 5.2 around 10 years ago.recently underwent bilat hip replacement in the past 7 months. has been losing weight recently, unintentionally, 11 lbs. no bone pain. no hematuria.to note, after hip replacement, he had catheter placed. r/o inflamm elevation in PSA\par Denies family history of prostate cancer.\par Denies maternal history of breast cancer. \par had prostate biopsy 10 years ago: negative per patient.\par Endorses great stream quality. \par IPSS = 0\par MAHNAZ = 3\par Does not take 5 alpha reductase inhibitor. Has never had prostate imaging.  Denies back pain, bone pain, weight loss.\par ex-smoker: 10 years\par no blood thinners\par \par FU 11/7/22: PSA 11.20, Free PSA 40%. UA negative. MRI? negative for lesions - [id number] - PSAD: 0.11\par \par

## 2022-11-07 NOTE — ASSESSMENT
[FreeTextEntry1] : 76 year old male with elevated PSA of 11.40,free PSA 39%. recent catheterization post dual hip replacement. has recent unintentional weight loss of 11 lbs.\par No Family History of prostate cancer. \par Had PBx 10 years ago: negative\par ex-smoker, no blood thinners.\par \par FU 11/7/22: PSA 11.20, Free PSA 40%. UA negative. MRI? negative for lesions - AA=732l - PSAD: 0.11\par \par \par Plan: TP biopsy prostate - nonfusion\par I had a discussion with the patient regarding the implication of an elevated PSA and the need for  biopsy the prostate using the transperineal approach. \par \par The potential side effects including bleeding and infection were also detailed. Additionally, we discussed the potential implication of a positive biopsy for prostate cancer, and depending on the grade and stage of the cancer the need for treatment including, active surveillance, radiation, radiation seed implants and surgery.  \par \par The patient has agreed to proceed with prostate biopsy. He will stop all aspirin and aspirin like products 10 days prior to the biopsy. There is no need for pre-procedural antibiotics, and he will self-administer a cleansing Fleet's enema just prior to the biopsy.  \par \par \par \par \par \par

## 2022-11-07 NOTE — LETTER BODY
[Dear  ___] : Dear  [unfilled], [Consult Letter:] : I had the pleasure of evaluating your patient, [unfilled]. [Please see my note below.] : Please see my note below. [Consult Closing:] : Thank you very much for allowing me to participate in the care of this patient.  If you have any questions, please do not hesitate to contact me. [Sincerely,] : Sincerely, [FreeTextEntry3] : Shayy Cardona MD\par Urologic Oncology\par [facility]\par \[facility] [place] [facility]\"" [place] [facility] at [place]\par [facility]\par

## 2022-11-14 ENCOUNTER — NON-APPOINTMENT (OUTPATIENT)
Age: 77
End: 2022-11-14

## 2022-11-15 ENCOUNTER — APPOINTMENT (OUTPATIENT)
Dept: UROLOGY | Facility: HOSPITAL | Age: 77
End: 2022-11-15

## 2022-11-16 ENCOUNTER — NON-APPOINTMENT (OUTPATIENT)
Age: 77
End: 2022-11-16

## 2022-11-23 ENCOUNTER — APPOINTMENT (OUTPATIENT)
Dept: UROLOGY | Facility: CLINIC | Age: 77
End: 2022-11-23

## 2022-11-23 VITALS
HEIGHT: 70 IN | OXYGEN SATURATION: 98 % | SYSTOLIC BLOOD PRESSURE: 111 MMHG | DIASTOLIC BLOOD PRESSURE: 67 MMHG | TEMPERATURE: 96.4 F | HEART RATE: 72 BPM | WEIGHT: 146 LBS | BODY MASS INDEX: 20.9 KG/M2

## 2022-11-23 DIAGNOSIS — R97.20 ELEVATED PROSTATE, SPECIFIC ANTIGEN [PSA]: ICD-10-CM

## 2022-11-23 PROCEDURE — 99214 OFFICE O/P EST MOD 30 MIN: CPT

## 2022-11-23 RX ORDER — (SALINE) 19; 7 G/133ML; G/133ML
ENEMA RECTAL
Qty: 1 | Refills: 0 | Status: DISCONTINUED | COMMUNITY
Start: 2022-11-07 | End: 2022-11-23

## 2022-11-23 NOTE — LETTER BODY
[Dear  ___] : Dear  [unfilled], [Consult Letter:] : I had the pleasure of evaluating your patient, [unfilled]. [Please see my note below.] : Please see my note below. [Consult Closing:] : Thank you very much for allowing me to participate in the care of this patient.  If you have any questions, please do not hesitate to contact me. [Sincerely,] : Sincerely, [FreeTextEntry3] : Shayy Cardona MD\par Urologic Oncology\par [facility] End[facility]rology\par [facility] [place] [facility] at [place]\par [facility]\par

## 2022-11-23 NOTE — ASSESSMENT
[FreeTextEntry1] : 76 year old male with elevated PSA of 11.40,free PSA 39%. recent catheterization post dual hip replacement. has recent unintentional weight loss of 11 lbs.\par No Family History of prostate cancer. \par Had PBx 10 years ago: negative\par ex-smoker, no blood thinners.\par \par FU 11/7/22: PSA 11.20, Free PSA 40%. UA negative. MRI? negative for lesions - FH=544c - PSAD: 0.11\par \par FU 11/23/22- Underwent Prostate biopsy on 11/15. Path all cores benign prostatic tissue \par \par Plan: \par reassure \par PSA and US pelvis for PVR in 6 months \par All path resutls were reviewed and explained thoroughly\par All the patient's questions were answered to the best of my ability.\par \par \par \par \par \par

## 2022-11-23 NOTE — HISTORY OF PRESENT ILLNESS
[FreeTextEntry1] : 77 year old male with recently diagnosed elevated PSA of 11.40 ng/ml, free PSA 39%. last PSA 5.2 around 10 years ago.recently underwent bilat hip replacement in the past 7 months. has been losing weight recently, unintentionally, 11 lbs. no bone pain. no hematuria.to note, after hip replacement, he had catheter placed. r/o inflamm elevation in PSA\par Denies family history of prostate cancer. Denies maternal history of breast cancer. \par had prostate biopsy 10 years ago: negative per patient.\par Endorses great stream quality. \par IPSS = 0 / MAHNAZ = 3\par Does not take 5 alpha reductase inhibitor. Has never had prostate imaging.  Denies back pain, bone pain, weight loss. ex-smoker: 10 years\par no blood thinners\par \par FU 11/7/22: PSA 11.20, Free PSA 40%. UA negative. MRI? negative for lesions - YR=202y - PSAD: 0.11\par FU 11/23/22- Underwent Prostate biopsy on 11/15/2022. Path all cores benign prostatic tissue \par \par \par

## 2023-01-23 ENCOUNTER — LABORATORY RESULT (OUTPATIENT)
Age: 78
End: 2023-01-23

## 2023-01-23 ENCOUNTER — APPOINTMENT (OUTPATIENT)
Dept: PULMONOLOGY | Facility: CLINIC | Age: 78
End: 2023-01-23
Payer: MEDICARE

## 2023-01-23 VITALS
SYSTOLIC BLOOD PRESSURE: 156 MMHG | WEIGHT: 154 LBS | RESPIRATION RATE: 15 BRPM | DIASTOLIC BLOOD PRESSURE: 77 MMHG | HEIGHT: 70 IN | OXYGEN SATURATION: 97 % | BODY MASS INDEX: 22.05 KG/M2 | TEMPERATURE: 97 F | HEART RATE: 77 BPM

## 2023-01-23 PROCEDURE — 36415 COLL VENOUS BLD VENIPUNCTURE: CPT

## 2023-01-23 PROCEDURE — 99214 OFFICE O/P EST MOD 30 MIN: CPT | Mod: 25

## 2023-01-23 NOTE — HISTORY OF PRESENT ILLNESS
[Former] : former [TextBox_4] : This letter  is regarding your patient  who  attended pulmonary out patient office today.  I have reviewed  patient's  past history, social history, family history and medication list. I also  reviewed nurse practitioners/ and fellows  notes and assessment and agree with it.  –The patient was referred by Dr Gong- had URI and CXR was c/w COPD- former smoker- quit 35yrs ago. Pt is asymptomatic\par ------------------------------No history of , fever, chills , rigors, chestpain, or hemoptysis. Questionable history of Raynauds phenomenon. No h/o significant weight loss in last few months. No history of liver dysfunction , collagen vascular disorder or chronic thromboembolic disease. I would classify her dyspnea as WHO  FUNCTIONAL CLASS II--------\par ------\par ----Pft date-------2/16/18 normal lung volumes--\par ----Ct scan date--2/24/18 IMPRESSION: Right upper lobe dominant mixed solid and groundglass opacity \par with other subcentimeter bilateral nodular opacities as above are likely \par infectious etiology and represent pneumonia. Clinical correlation for \par chronic mycobacterial avium complex infection is recommended given the \par distribution of some of the tree-in-bud opacities. 1 to 3 month follow-up \par chest CT is recommended to ensure resolution. \par \par Coronary artery atherosclerotic disease. \par ---\par -3/2018 here to review CT chest- he has no current resp complaints--------\par \par \par 10/2022 copd- cough - c/o wt loss\par \par \par JAN 2023-----feels better cough is improved weight has stabilized--- seeing endocrinologist for hypothyroidism--

## 2023-01-24 LAB
25(OH)D3 SERPL-MCNC: 22.5 NG/ML
ALBUMIN SERPL ELPH-MCNC: 4.7 G/DL
ALP BLD-CCNC: 73 U/L
ALT SERPL-CCNC: 15 U/L
ANION GAP SERPL CALC-SCNC: 12 MMOL/L
AST SERPL-CCNC: 23 U/L
BASOPHILS # BLD AUTO: 0.07 K/UL
BASOPHILS NFR BLD AUTO: 0.9 %
BILIRUB SERPL-MCNC: 0.4 MG/DL
BUN SERPL-MCNC: 18 MG/DL
CALCIUM SERPL-MCNC: 9.9 MG/DL
CHLORIDE SERPL-SCNC: 108 MMOL/L
CO2 SERPL-SCNC: 24 MMOL/L
CREAT SERPL-MCNC: 1.01 MG/DL
EGFR: 77 ML/MIN/1.73M2
EOSINOPHIL # BLD AUTO: 0.16 K/UL
EOSINOPHIL NFR BLD AUTO: 2.1 %
ESTIMATED AVERAGE GLUCOSE: 114 MG/DL
GLUCOSE SERPL-MCNC: 98 MG/DL
HBA1C MFR BLD HPLC: 5.6 %
HCT VFR BLD CALC: 42.1 %
HGB BLD-MCNC: 13.5 G/DL
IMM GRANULOCYTES NFR BLD AUTO: 0.5 %
LYMPHOCYTES # BLD AUTO: 1.39 K/UL
LYMPHOCYTES NFR BLD AUTO: 18.3 %
MAN DIFF?: NORMAL
MCHC RBC-ENTMCNC: 30.8 PG
MCHC RBC-ENTMCNC: 32.1 GM/DL
MCV RBC AUTO: 95.9 FL
MONOCYTES # BLD AUTO: 0.62 K/UL
MONOCYTES NFR BLD AUTO: 8.2 %
NEUTROPHILS # BLD AUTO: 5.31 K/UL
NEUTROPHILS NFR BLD AUTO: 70 %
NT-PROBNP SERPL-MCNC: 59 PG/ML
PLATELET # BLD AUTO: 375 K/UL
POTASSIUM SERPL-SCNC: 5.2 MMOL/L
PROT SERPL-MCNC: 7.5 G/DL
RBC # BLD: 4.39 M/UL
RBC # FLD: 13.8 %
SODIUM SERPL-SCNC: 144 MMOL/L
TSH SERPL-ACNC: 5.4 UIU/ML
VIT B12 SERPL-MCNC: 771 PG/ML
WBC # FLD AUTO: 7.59 K/UL

## 2023-02-28 ENCOUNTER — APPOINTMENT (OUTPATIENT)
Dept: ENDOCRINOLOGY | Facility: CLINIC | Age: 78
End: 2023-02-28

## 2023-03-07 ENCOUNTER — APPOINTMENT (OUTPATIENT)
Dept: NEUROLOGY | Facility: CLINIC | Age: 78
End: 2023-03-07

## 2023-03-10 ENCOUNTER — APPOINTMENT (OUTPATIENT)
Dept: ORTHOPEDIC SURGERY | Facility: CLINIC | Age: 78
End: 2023-03-10
Payer: MEDICARE

## 2023-03-10 PROCEDURE — 72100 X-RAY EXAM L-S SPINE 2/3 VWS: CPT

## 2023-03-10 PROCEDURE — 99214 OFFICE O/P EST MOD 30 MIN: CPT

## 2023-03-10 RX ORDER — METHYLPREDNISOLONE 4 MG/1
4 TABLET ORAL
Qty: 1 | Refills: 0 | Status: ACTIVE | COMMUNITY
Start: 2023-03-10 | End: 1900-01-01

## 2023-03-10 NOTE — ASSESSMENT
[FreeTextEntry1] : Patient given prescription for MRI, follow up after study is completed to discuss results. \par \par Patient will begin Medrol.  Patient advised not to take any NSAIDs while taking the steroids. \par \par Recommend: - NSAID - Heating pad - Muscle relaxer - Core strengthening exercise - Hamstring stretching exercise Patient is given back rehabilitation exercise book.

## 2023-03-10 NOTE — HISTORY OF PRESENT ILLNESS
[de-identified] : Follow up lumbar spine MRI Results at [facility]. States he has localized pain. Admits to taking Ibuprofen/Tylenol PRN for pain.\par

## 2023-03-15 ENCOUNTER — APPOINTMENT (OUTPATIENT)
Dept: ORTHOPEDIC SURGERY | Facility: CLINIC | Age: 78
End: 2023-03-15

## 2023-04-14 ENCOUNTER — FORM ENCOUNTER (OUTPATIENT)
Age: 78
End: 2023-04-14

## 2023-04-15 ENCOUNTER — APPOINTMENT (OUTPATIENT)
Dept: MRI IMAGING | Facility: CLINIC | Age: 78
End: 2023-04-15
Payer: MEDICARE

## 2023-04-15 ENCOUNTER — OUTPATIENT (OUTPATIENT)
Dept: OUTPATIENT SERVICES | Facility: HOSPITAL | Age: 78
LOS: 1 days | End: 2023-04-15
Payer: MEDICARE

## 2023-04-15 ENCOUNTER — RESULT REVIEW (OUTPATIENT)
Age: 78
End: 2023-04-15

## 2023-04-15 DIAGNOSIS — M51.36 OTHER INTERVERTEBRAL DISC DEGENERATION, LUMBAR REGION: ICD-10-CM

## 2023-04-15 PROCEDURE — 72148 MRI LUMBAR SPINE W/O DYE: CPT | Mod: 26,MH

## 2023-04-15 PROCEDURE — 72148 MRI LUMBAR SPINE W/O DYE: CPT | Mod: MH

## 2023-04-18 ENCOUNTER — APPOINTMENT (OUTPATIENT)
Dept: PULMONOLOGY | Facility: CLINIC | Age: 78
End: 2023-04-18
Payer: MEDICARE

## 2023-04-18 VITALS
BODY MASS INDEX: 21.9 KG/M2 | OXYGEN SATURATION: 98 % | HEART RATE: 76 BPM | SYSTOLIC BLOOD PRESSURE: 155 MMHG | DIASTOLIC BLOOD PRESSURE: 79 MMHG | HEIGHT: 70 IN | TEMPERATURE: 98 F | RESPIRATION RATE: 16 BRPM | WEIGHT: 153 LBS

## 2023-04-18 PROCEDURE — 99215 OFFICE O/P EST HI 40 MIN: CPT | Mod: 25

## 2023-04-18 PROCEDURE — 36415 COLL VENOUS BLD VENIPUNCTURE: CPT

## 2023-04-18 NOTE — HISTORY OF PRESENT ILLNESS
[Former] : former [TextBox_4] : This letter  is regarding your patient  who  attended pulmonary out patient office today.  I have reviewed  patient's  past history, social history, family history and medication list. I also  reviewed nurse practitioners/ and fellows  notes and assessment and agree with it.  –The patient was referred by Dr Gong- had URI and CXR was c/w COPD- former smoker- quit 35yrs ago. Pt is asymptomatic\par ------------------------------No history of , fever, chills , rigors, chestpain, or hemoptysis. Questionable history of Raynauds phenomenon. No h/o significant weight loss in last few months. No history of liver dysfunction , collagen vascular disorder or chronic thromboembolic disease. I would classify her dyspnea as WHO  FUNCTIONAL CLASS II--------\par ------\par ----Pft date-------2/16/18 normal lung volumes--\par ----Ct scan date--2/24/18 IMPRESSION: Right upper lobe dominant mixed solid and groundglass opacity \par with other subcentimeter bilateral nodular opacities as above are likely \par infectious etiology and represent pneumonia. Clinical correlation for \par chronic mycobacterial avium complex infection is recommended given the \par distribution of some of the tree-in-bud opacities. 1 to 3 month follow-up \par chest CT is recommended to ensure resolution. \par \par Coronary artery atherosclerotic disease. \par ---\par -3/2018 here to review CT chest- he has no current resp complaints--------\par \par \par 10/2022 copd- cough - c/o wt loss\par \par \par JAN 2023-----feels better cough is improved weight has stabilized--- seeing endocrinologist for hypothyroidism--\par \par \par 4/2023 no pulm complaints,  seeing endocrinologist for hypothyroidism and urology for enlarged prostate--NEEDS PFT - - -

## 2023-04-18 NOTE — DISCUSSION/SUMMARY
[FreeTextEntry1] : ------------Assessment plan---------- patient has been referred here for further opinion regarding pulmonary problem-------76yo referred for eval of ?COPD--BUT - PFT normal.---he has positive smoking---\par ------- stressed about his wife's illness---------\par His CT chest is not consistent w/ COPD but he does have ground glass opacities and tree in bud opacities-\par repeat CT chest \par labs in our office today\par f/u with urology\par Hypothyroidism supposed to see endocrinologist---------\par f/u in 10/2023 with PFT\par \par \par Thanks for allowing  me to participate  in the care of this patient.  Patient at this time  will follow  the above mentioned recommendations and return back for follow up visit. If you have any questions  I can be reached  at # [phone].  –\par \par Dixie Deluna MD, [facility] \par

## 2023-04-19 LAB
ALBUMIN SERPL ELPH-MCNC: 4.6 G/DL
ALP BLD-CCNC: 72 U/L
ALT SERPL-CCNC: 15 U/L
ANION GAP SERPL CALC-SCNC: 13 MMOL/L
AST SERPL-CCNC: 24 U/L
BASOPHILS # BLD AUTO: 0.06 K/UL
BASOPHILS NFR BLD AUTO: 1 %
BILIRUB SERPL-MCNC: 0.5 MG/DL
BUN SERPL-MCNC: 20 MG/DL
CALCIUM SERPL-MCNC: 10.1 MG/DL
CHLORIDE SERPL-SCNC: 105 MMOL/L
CO2 SERPL-SCNC: 26 MMOL/L
CREAT SERPL-MCNC: 1 MG/DL
EGFR: 78 ML/MIN/1.73M2
EOSINOPHIL # BLD AUTO: 0.12 K/UL
EOSINOPHIL NFR BLD AUTO: 1.9 %
GLUCOSE SERPL-MCNC: 89 MG/DL
HCT VFR BLD CALC: 42.7 %
HGB BLD-MCNC: 13.7 G/DL
IMM GRANULOCYTES NFR BLD AUTO: 0.8 %
LYMPHOCYTES # BLD AUTO: 1.38 K/UL
LYMPHOCYTES NFR BLD AUTO: 21.9 %
MAN DIFF?: NORMAL
MCHC RBC-ENTMCNC: 29.8 PG
MCHC RBC-ENTMCNC: 32.1 GM/DL
MCV RBC AUTO: 93 FL
MONOCYTES # BLD AUTO: 0.62 K/UL
MONOCYTES NFR BLD AUTO: 9.9 %
NEUTROPHILS # BLD AUTO: 4.06 K/UL
NEUTROPHILS NFR BLD AUTO: 64.5 %
NT-PROBNP SERPL-MCNC: 46 PG/ML
PLATELET # BLD AUTO: 383 K/UL
POTASSIUM SERPL-SCNC: 4.9 MMOL/L
PROT SERPL-MCNC: 7.4 G/DL
RBC # BLD: 4.59 M/UL
RBC # FLD: 13.8 %
SODIUM SERPL-SCNC: 144 MMOL/L
WBC # FLD AUTO: 6.29 K/UL

## 2023-04-25 ENCOUNTER — APPOINTMENT (OUTPATIENT)
Dept: CARDIOLOGY | Facility: CLINIC | Age: 78
End: 2023-04-25
Payer: MEDICARE

## 2023-04-25 ENCOUNTER — NON-APPOINTMENT (OUTPATIENT)
Age: 78
End: 2023-04-25

## 2023-04-25 VITALS
HEART RATE: 67 BPM | SYSTOLIC BLOOD PRESSURE: 131 MMHG | WEIGHT: 155 LBS | BODY MASS INDEX: 22.19 KG/M2 | OXYGEN SATURATION: 98 % | DIASTOLIC BLOOD PRESSURE: 80 MMHG | HEIGHT: 70 IN

## 2023-04-25 DIAGNOSIS — I45.10 UNSPECIFIED RIGHT BUNDLE-BRANCH BLOCK: ICD-10-CM

## 2023-04-25 PROCEDURE — 93000 ELECTROCARDIOGRAM COMPLETE: CPT

## 2023-04-25 PROCEDURE — 99214 OFFICE O/P EST MOD 30 MIN: CPT

## 2023-06-06 ENCOUNTER — APPOINTMENT (OUTPATIENT)
Dept: ORTHOPEDIC SURGERY | Facility: CLINIC | Age: 78
End: 2023-06-06
Payer: MEDICARE

## 2023-06-06 VITALS — WEIGHT: 155 LBS | HEIGHT: 70 IN | BODY MASS INDEX: 22.19 KG/M2

## 2023-06-06 PROCEDURE — 73502 X-RAY EXAM HIP UNI 2-3 VIEWS: CPT

## 2023-06-06 PROCEDURE — 99213 OFFICE O/P EST LOW 20 MIN: CPT

## 2023-06-06 NOTE — PHYSICAL EXAM
[Left] : left hip [Right] : right hip [5___] : adduction 5[unfilled]/5 [] : no erythema [Bilateral] : hip bilaterally [AP] : anteroposterior [Lateral] : lateral [FreeTextEntry8] : TTP Piriformis [FreeTextEntry9] : Well-aligned, well-fixed prosthesis. No lucency noted.\par

## 2023-06-06 NOTE — DISCUSSION/SUMMARY
[de-identified] : Patient progressing well at this time. Will begin PT to work on strength and ROM\par \par f/u in 3 years

## 2023-06-15 ENCOUNTER — APPOINTMENT (OUTPATIENT)
Dept: ORTHOPEDIC SURGERY | Facility: CLINIC | Age: 78
End: 2023-06-15
Payer: MEDICARE

## 2023-06-15 VITALS — BODY MASS INDEX: 22.19 KG/M2 | HEIGHT: 70 IN | WEIGHT: 155 LBS

## 2023-06-15 PROCEDURE — 99214 OFFICE O/P EST MOD 30 MIN: CPT

## 2023-06-15 NOTE — HISTORY OF PRESENT ILLNESS
[de-identified] : Follow up lumbar spine MRI Results at [facility] [place]. States his pain is radiating into his buttock. Admits to finishing Medrol pack with some relief. Denies taking pain medication.\par

## 2023-06-15 NOTE — DATA REVIEWED
[MRI] : MRI [Lumbar Spine] : lumbar spine [Report was reviewed and noted in the chart] : The report was reviewed and noted in the chart [I independently reviewed and interpreted images and report] : I independently reviewed and interpreted images and report [FreeTextEntry1] : Multilevel lumbar spondylosis \par DDD L3-S1\par Bilateral facet arthropathy with stenosis L3-S1\par Tarlov cyst

## 2023-06-15 NOTE — ASSESSMENT
[FreeTextEntry1] : Multilevel lumbar spondylosis \par DDD L3-S1\par Bilateral facet arthropathy with stenosis L3-S1\par Tarlov cyst \par \par Patient will begin physical therapy. \par \par Referral to pain management for injections, follow up 2 weeks after injection. \par \par Recommend: - NSAID - Heating pad - Muscle relaxer - Core strengthening exercise - Hamstring stretching exercise Patient is given back rehabilitation exercise book. \par \par Follow up in 2 months

## 2023-06-20 ENCOUNTER — FORM ENCOUNTER (OUTPATIENT)
Age: 78
End: 2023-06-20

## 2023-06-22 ENCOUNTER — APPOINTMENT (OUTPATIENT)
Dept: ULTRASOUND IMAGING | Facility: CLINIC | Age: 78
End: 2023-06-22
Payer: MEDICARE

## 2023-06-22 ENCOUNTER — OUTPATIENT (OUTPATIENT)
Dept: OUTPATIENT SERVICES | Facility: HOSPITAL | Age: 78
LOS: 1 days | End: 2023-06-22
Payer: MEDICARE

## 2023-06-22 DIAGNOSIS — R97.20 ELEVATED PROSTATE SPECIFIC ANTIGEN [PSA]: ICD-10-CM

## 2023-06-22 PROCEDURE — 76857 US EXAM PELVIC LIMITED: CPT

## 2023-06-22 PROCEDURE — 76857 US EXAM PELVIC LIMITED: CPT | Mod: 26

## 2023-06-30 ENCOUNTER — APPOINTMENT (OUTPATIENT)
Dept: UROLOGY | Facility: CLINIC | Age: 78
End: 2023-06-30
Payer: MEDICARE

## 2023-06-30 VITALS
WEIGHT: 155 LBS | HEIGHT: 70 IN | OXYGEN SATURATION: 97 % | HEART RATE: 73 BPM | DIASTOLIC BLOOD PRESSURE: 71 MMHG | TEMPERATURE: 97.1 F | BODY MASS INDEX: 22.19 KG/M2 | SYSTOLIC BLOOD PRESSURE: 113 MMHG

## 2023-06-30 PROCEDURE — 99213 OFFICE O/P EST LOW 20 MIN: CPT

## 2023-06-30 NOTE — HISTORY OF PRESENT ILLNESS
[FreeTextEntry1] : 77 year old male with recently diagnosed elevated PSA of 11.40 ng/ml, free PSA 39%. last PSA 5.2 around 10 years ago.recently underwent bilat hip replacement in the past 7 months. has been losing weight recently, unintentionally, 11 lbs. no bone pain. no hematuria.to note, after hip replacement, he had catheter placed. r/o inflamm elevation in PSA\par Denies family history of prostate cancer. Denies maternal history of breast cancer. \par had prostate biopsy 10 years ago: negative per patient.\par Endorses great stream quality. \par IPSS = 0 / MAHNAZ = 3\par Does not take 5 alpha reductase inhibitor. Has never had prostate imaging.  Denies back pain, bone pain, weight loss. ex-smoker: 10 years\par no blood thinners\par \par FU 11/7/22: PSA 11.20, Free PSA 40%. UA negative. MRI? negative for lesions - SH=278x - PSAD: 0.11\par FU 11/23/22- Underwent Prostate biopsy on 11/15/2022. Path all cores benign prostatic tissue\par June 2023: PSA = 9 / US pelvis: post void residual is 42 mL. The prostate volume is 57 mL. \par \par \par

## 2023-06-30 NOTE — ASSESSMENT
[FreeTextEntry1] : 76 year old male with elevated PSA of 11.40,free PSA 39%. recent catheterization post dual hip replacement. has recent unintentional weight loss of 11 lbs.\par No Family History of prostate cancer. \par Had PBx 10 years ago: negative\par ex-smoker, no blood thinners.\par \par FU 11/7/22: PSA 11.20, Free PSA 40%. UA negative. MRI? negative for lesions - ED=292l - PSAD: 0.11\par \par FU 11/23/22- Underwent Prostate biopsy on 11/15. Path all cores benign prostatic tissue \par June 2023: PSA = 9 / US pelvis: post void residual is 42 mL. The prostate volume is 57 mL. \par  \par Plan: \par reassure - psa stable, only repeat in 1 year\par RTC 6 months for an office PVR\par \par \par \par \par

## 2023-06-30 NOTE — LETTER BODY
[Dear  ___] : Dear  [unfilled], [Consult Letter:] : I had the pleasure of evaluating your patient, [unfilled]. [Please see my note below.] : Please see my note below. [Consult Closing:] : Thank you very much for allowing me to participate in the care of this patient.  If you have any questions, please do not hesitate to contact me. [Sincerely,] : Sincerely, [FreeTextEntry3] : Shayy Cardona MD\par Urologic Oncology\par Rob[facility] Endoscopic urology\par [facility] [place] of Urology at [place]\par [facility]\par

## 2023-07-05 ENCOUNTER — APPOINTMENT (OUTPATIENT)
Dept: ORTHOPEDIC SURGERY | Facility: CLINIC | Age: 78
End: 2023-07-05

## 2023-09-21 ENCOUNTER — APPOINTMENT (OUTPATIENT)
Dept: ORTHOPEDIC SURGERY | Facility: CLINIC | Age: 78
End: 2023-09-21
Payer: MEDICARE

## 2023-09-21 PROCEDURE — 99214 OFFICE O/P EST MOD 30 MIN: CPT

## 2023-09-25 ENCOUNTER — APPOINTMENT (OUTPATIENT)
Dept: CT IMAGING | Facility: CLINIC | Age: 78
End: 2023-09-25
Payer: MEDICARE

## 2023-09-25 ENCOUNTER — OUTPATIENT (OUTPATIENT)
Dept: OUTPATIENT SERVICES | Facility: HOSPITAL | Age: 78
LOS: 1 days | End: 2023-09-25
Payer: MEDICARE

## 2023-09-25 DIAGNOSIS — R05.9 COUGH, UNSPECIFIED: ICD-10-CM

## 2023-09-25 PROCEDURE — 71250 CT THORAX DX C-: CPT | Mod: 26,MH

## 2023-09-25 PROCEDURE — 71250 CT THORAX DX C-: CPT

## 2023-10-03 ENCOUNTER — APPOINTMENT (OUTPATIENT)
Dept: PULMONOLOGY | Facility: CLINIC | Age: 78
End: 2023-10-03
Payer: MEDICARE

## 2023-10-03 ENCOUNTER — MED ADMIN CHARGE (OUTPATIENT)
Age: 78
End: 2023-10-03

## 2023-10-03 VITALS
HEIGHT: 68 IN | DIASTOLIC BLOOD PRESSURE: 78 MMHG | WEIGHT: 158 LBS | SYSTOLIC BLOOD PRESSURE: 130 MMHG | HEART RATE: 81 BPM | OXYGEN SATURATION: 97 % | BODY MASS INDEX: 23.95 KG/M2

## 2023-10-03 DIAGNOSIS — R05.9 COUGH, UNSPECIFIED: ICD-10-CM

## 2023-10-03 DIAGNOSIS — E03.9 HYPOTHYROIDISM, UNSPECIFIED: ICD-10-CM

## 2023-10-03 LAB
ALBUMIN SERPL ELPH-MCNC: 4.7 G/DL
ALP BLD-CCNC: 67 U/L
ALT SERPL-CCNC: 16 U/L
ANION GAP SERPL CALC-SCNC: 11 MMOL/L
AST SERPL-CCNC: 21 U/L
BILIRUB SERPL-MCNC: 0.3 MG/DL
BUN SERPL-MCNC: 20 MG/DL
CALCIUM SERPL-MCNC: 10.3 MG/DL
CHLORIDE SERPL-SCNC: 105 MMOL/L
CO2 SERPL-SCNC: 25 MMOL/L
CREAT SERPL-MCNC: 0.93 MG/DL
EGFR: 85 ML/MIN/1.73M2
GLUCOSE SERPL-MCNC: 96 MG/DL
HCT VFR BLD CALC: 42.9 %
HGB BLD-MCNC: 14 G/DL
MCHC RBC-ENTMCNC: 30.2 PG
MCHC RBC-ENTMCNC: 32.6 GM/DL
MCV RBC AUTO: 92.7 FL
PLATELET # BLD AUTO: 361 K/UL
POTASSIUM SERPL-SCNC: 4.8 MMOL/L
PROT SERPL-MCNC: 7.2 G/DL
RBC # BLD: 4.63 M/UL
RBC # FLD: 14.1 %
SODIUM SERPL-SCNC: 141 MMOL/L
WBC # FLD AUTO: 7.12 K/UL

## 2023-10-03 PROCEDURE — G0008: CPT

## 2023-10-03 PROCEDURE — 94729 DIFFUSING CAPACITY: CPT

## 2023-10-03 PROCEDURE — 90662 IIV NO PRSV INCREASED AG IM: CPT

## 2023-10-03 PROCEDURE — 94726 PLETHYSMOGRAPHY LUNG VOLUMES: CPT

## 2023-10-03 PROCEDURE — 99214 OFFICE O/P EST MOD 30 MIN: CPT | Mod: 25

## 2023-10-03 PROCEDURE — 94010 BREATHING CAPACITY TEST: CPT

## 2023-10-03 PROCEDURE — 36415 COLL VENOUS BLD VENIPUNCTURE: CPT

## 2023-10-03 PROCEDURE — ZZZZZ: CPT

## 2023-12-14 ENCOUNTER — APPOINTMENT (OUTPATIENT)
Dept: ORTHOPEDIC SURGERY | Facility: CLINIC | Age: 78
End: 2023-12-14
Payer: MEDICARE

## 2023-12-14 DIAGNOSIS — M51.36 OTHER INTERVERTEBRAL DISC DEGENERATION, LUMBAR REGION: ICD-10-CM

## 2023-12-14 DIAGNOSIS — M51.37 OTHER INTERVERTEBRAL DISC DEGENERATION, LUMBOSACRAL REGION: ICD-10-CM

## 2023-12-14 DIAGNOSIS — G57.01 LESION OF SCIATIC NERVE, RIGHT LOWER LIMB: ICD-10-CM

## 2023-12-14 DIAGNOSIS — M48.061 SPINAL STENOSIS, LUMBAR REGION WITHOUT NEUROGENIC CLAUDICATION: ICD-10-CM

## 2023-12-14 DIAGNOSIS — M47.817 SPONDYLOSIS W/OUT MYELOPATHY OR RADICULOPATHY, LUMBOSACRAL REGION: ICD-10-CM

## 2023-12-14 PROCEDURE — 99214 OFFICE O/P EST MOD 30 MIN: CPT

## 2023-12-14 NOTE — ASSESSMENT
[FreeTextEntry1] : Multilevel lumbar spondylosis DDD L3-S1 Bilateral facet arthropathy with stenosis L3-S1 Tarlov cyst  79 yo male presents today for follow up on his lumbar spine. Patient still having 70-80% relief from previous SUBHASH with Dr. Schrader in August. Discussed that he may continue HEP and see how long injection lasts for.   - Patient given referral to medical massage for evaluation and treatment.    - Patient will continue HEP. Emphasized daily stretching and strengthening.   - Recommend NSAIDs PRN - Recommend heating pad use to decrease muscle spasm - Discussed the importance of home exercises, including but not limited to hamstring stretching and core strengthening   Patient was educated on their diagnosis today. All questions answered and patient expressed understanding.  Follow up PRN

## 2023-12-14 NOTE — HISTORY OF PRESENT ILLNESS
[de-identified] : Follow up lumbar spine. Patient states he has intermittent, localized pain. Patient admits to doing HEP. Patient denies taking pain medication.   Today's Pain 1-2/10

## 2024-02-29 ENCOUNTER — APPOINTMENT (OUTPATIENT)
Dept: ORTHOPEDIC SURGERY | Facility: CLINIC | Age: 79
End: 2024-02-29
Payer: MEDICARE

## 2024-02-29 DIAGNOSIS — M48.02 SPINAL STENOSIS, CERVICAL REGION: ICD-10-CM

## 2024-02-29 DIAGNOSIS — M47.22 OTHER SPONDYLOSIS WITH RADICULOPATHY, CERVICAL REGION: ICD-10-CM

## 2024-02-29 DIAGNOSIS — M47.812 SPONDYLOSIS W/OUT MYELOPATHY OR RADICULOPATHY, CERVICAL REGION: ICD-10-CM

## 2024-02-29 DIAGNOSIS — M62.838 OTHER MUSCLE SPASM: ICD-10-CM

## 2024-02-29 PROCEDURE — 99214 OFFICE O/P EST MOD 30 MIN: CPT

## 2024-02-29 PROCEDURE — 72040 X-RAY EXAM NECK SPINE 2-3 VW: CPT

## 2024-02-29 NOTE — IMAGING
[Facet arthropathy] : Facet arthropathy [Disc space narrowing] : Disc space narrowing [FreeTextEntry1] : DDD C4-7

## 2024-02-29 NOTE — PHYSICAL EXAM
[de-identified] : Constitutional: Well groomed and developed.  Respiratory: Normal, unlabored breathing. No use of accessory muscles.  Skin: No rashes or ulcers. Skin warm and dry.  Psychiatric: Oriented to time, place, person and event. No acute distress.   Neck:    Posture: normal   AROM:  Flexion- chin to chest  Extension- 20  R rotation- 45 L rotation- 30 Moderate pain with neck ROM.  Gait: Heel to toe Patient able to walk on toes and heels.   Tenderness:  Cervical: Moderate tenderness on palpation      TTP over bilateral trapezius  Motor:                        R               L              UE:                   Deltoid            5                5 WE                 5                5 Triceps          5                5                5                5 Intrinsics       5                5 Biceps          5                5                                  R         L DTR:  Biceps:                     2+        2+ Brachioradialis:        2+        2+ Triceps:                   2+         2+   Sensory: Light touch sensation intact on C5-T1  Spurling sign: Normal  Babinski's sign: Negative Bilaterally Harrell's sign: Negative Bilaterally  Abduction sign: Negative Bilaterally

## 2024-02-29 NOTE — HISTORY OF PRESENT ILLNESS
[Neck] : neck [Gradual] : gradual [2] : 2 [1] : 2 [Dull/Aching] : dull/aching [Constant] : constant [Rest] : rest [Retired] : Work status: retired [de-identified] : Patient presents today with neck pain for 1 year with NKI. Patient denies previous treatments. Patient states his pain is localized to the left side. Patient denies having any headaches or issues with his balance. Patient states he hears "grinding" when turning his head side to side. Patient admits to doing HEP. Patient denies taking pain medication. Patient denies recent imaging. [] : no [de-identified] : movement

## 2024-02-29 NOTE — ASSESSMENT
[FreeTextEntry1] : 79 yo male presents today for eval of his neck pain. X-rays show DDD C4-7. Recommend proceeding with MRI for further eval as well as starting PT.   - Patient given prescription for MRI, follow up after study is completed to discuss results.   - Recommend physical therapy to regain range of motion, strengthening and symptomatic improvement. Prescription given in office today.   - Recommend NSAIDs PRN - Recommend heating pad use to decrease muscle spasm - Discussed the importance of home exercises, including but not limited to neck stretching and cervical traction   Patient was educated on their diagnosis today. All questions answered and patient expressed understanding.  F/U after MRI

## 2024-03-07 ENCOUNTER — APPOINTMENT (OUTPATIENT)
Dept: UROLOGY | Facility: CLINIC | Age: 79
End: 2024-03-07
Payer: MEDICARE

## 2024-03-07 VITALS
BODY MASS INDEX: 23.95 KG/M2 | DIASTOLIC BLOOD PRESSURE: 76 MMHG | HEIGHT: 68 IN | RESPIRATION RATE: 15 BRPM | WEIGHT: 158 LBS | TEMPERATURE: 98.2 F | SYSTOLIC BLOOD PRESSURE: 133 MMHG | OXYGEN SATURATION: 98 % | HEART RATE: 90 BPM

## 2024-03-07 DIAGNOSIS — N40.0 BENIGN PROSTATIC HYPERPLASIA WITHOUT LOWER URINARY TRACT SYMPMS: ICD-10-CM

## 2024-03-07 DIAGNOSIS — R97.20 BENIGN PROSTATIC HYPERPLASIA WITHOUT LOWER URINARY TRACT SYMPMS: ICD-10-CM

## 2024-03-07 PROCEDURE — 99214 OFFICE O/P EST MOD 30 MIN: CPT

## 2024-03-07 RX ORDER — ROSUVASTATIN CALCIUM 5 MG/1
TABLET, FILM COATED ORAL
Refills: 0 | Status: ACTIVE | COMMUNITY

## 2024-03-07 NOTE — HISTORY OF PRESENT ILLNESS
[FreeTextEntry1] : 78 year old male with recently diagnosed elevated PSA of 11.40 ng/ml, free PSA 39%. last PSA 5.2 around 10 years ago.recently underwent bilat hip replacement in the past 7 months. has been losing weight recently, unintentionally, 11 lbs. no bone pain. no hematuria.to note, after hip replacement, he had catheter placed. r/o inflamm elevation in PSA Denies family history of prostate cancer. Denies maternal history of breast cancer.  had prostate biopsy 10 years ago: negative per patient. IPSS = 0 / MAHNAZ = 3 ex-smoker: 10 years FU 11/7/22: PSA 11.20, Free PSA 40%. UA negative. MRI? negative for lesions - GT=443t - PSAD: 0.11 FU 11/23/22- Underwent Prostate biopsy on 11/15/2022. Path all cores benign prostatic tissue June 2023: PSA = 9 / US pelvis: post void residual is 42 mL. The prostate volume is 57 mL.  March 2024: office PVR = 16 mL

## 2024-03-07 NOTE — END OF VISIT
4/22/2022  Chief Complaint   Patient presents with    Circulatory Problem     6 month FU with AAA scan and office visit       Pain Assessment  The patient is currently not experiencing any pain at this time. HISTORY OF PRESENT ILLNESS:      Deepika Chau is a 68 y.o. male who presents with a complaint of Aneurysm follow up. The Aneurysm is located in the abdominal aorta. Patient was last seen 9/16/21. Previous measurement was 4.71 x 4.51 cms per duplex scan at last evaluation. Current measurement is 4.85 x 4.51 cms per duplex scan. The patient has been asymptomatic since last visit. Contributing factors include use of tobacco and hypertension. Previous diagnostic tests have included CT scan and vascular scan. Recent diagnostic tests include: vascular scan. There has been no relevant prior surgery. He continues to smoke just under 1 PPD and has been smoking since he was 15years old. He reports that he played tennis for about 50 years. Review of Systems   Constitutional: Negative. HENT: Negative. Eyes: Negative. Respiratory: Negative. Cardiovascular: Negative. Gastrointestinal: Negative. Endocrine: Negative. Genitourinary: Negative. Musculoskeletal: Negative. Skin: Negative. Allergic/Immunologic: Negative. Neurological: Negative. Hematological: Negative. Psychiatric/Behavioral: Negative. All other systems reviewed and are negative. Allergies   Allergen Reactions    Lidocaine      Burning sensation         Prior to Visit Medications    Medication Sig Taking?  Authorizing Provider   amLODIPine (NORVASC) 10 MG tablet TAKE 1 TABLET EVERY DAY Yes Sid Daigle MD   metoprolol tartrate (LOPRESSOR) 25 MG tablet TAKE 1 TABLET DAILY Yes Sid Daigle MD   ondansetron (ZOFRAN ODT) 4 MG disintegrating tablet Take 1 tablet by mouth every 6 hours as needed for Nausea Yes Chelita Lopes MD   levothyroxine (SYNTHROID) 88 MCG tablet TAKE 1 TABLET Jennie Miller MD   lisinopril (PRINIVIL;ZESTRIL) 30 MG tablet TAKE 1 TABLET EVERY DAY Yes Matt Blanco MD   gabapentin (NEURONTIN) 300 MG capsule Take 300 mg by mouth 3 times daily. Yes Historical Provider, MD   simvastatin (ZOCOR) 20 MG tablet TAKE 1 TABLET AT BEDTIME Yes Matt Blanco MD   mirtazapine (REMERON) 45 MG tablet Take 45 mg by mouth nightly Yes Historical Provider, MD   LORazepam (ATIVAN) 1 MG tablet Take 1 mg by mouth 2 times daily as needed for Anxiety. Yes Historical Provider, MD   escitalopram (LEXAPRO) 10 MG tablet Take 15 mg by mouth daily  Yes Historical Provider, MD   aspirin 81 MG tablet Take 81 mg by mouth daily. Yes Historical Provider, MD   Calcium Polycarbophil (FIBERCON PO) As directed  Yes Historical Provider, MD       History reviewed. Physical Exam  Vitals and nursing note reviewed. Constitutional:       Appearance: Normal appearance. He is well-developed. HENT:      Head: Normocephalic and atraumatic. Right Ear: External ear normal.      Left Ear: External ear normal.   Eyes:      General: No scleral icterus. Conjunctiva/sclera: Conjunctivae normal.      Pupils: Pupils are equal, round, and reactive to light. Neck:      Vascular: No carotid bruit or JVD. Trachea: No tracheal deviation. Cardiovascular:      Rate and Rhythm: Normal rate and regular rhythm. Pulses:           Carotid pulses are 2+ on the right side and 2+ on the left side. Radial pulses are 2+ on the right side and 2+ on the left side. Popliteal pulses are 2+ on the right side and 2+ on the left side. Dorsalis pedis pulses are 2+ on the right side and 2+ on the left side. Posterior tibial pulses are 2+ on the right side and 2+ on the left side. Heart sounds: Normal heart sounds. No murmur heard. No gallop.        Comments:   Popliteals are enlarged  Pulmonary:      Effort: Pulmonary effort is normal.      Breath sounds: [Time Spent: ___ minutes] : I have spent [unfilled] minutes of time on the encounter. Normal breath sounds. No wheezing or rales. Chest:      Chest wall: No tenderness. Abdominal:      General: Bowel sounds are normal. There is no distension or abdominal bruit. Palpations: Abdomen is soft. There is pulsatile mass (AAA measures 4.85 cm x 4.51 cm per vascular scan today). Tenderness: There is no abdominal tenderness. There is no guarding or rebound. Musculoskeletal:         General: No tenderness. Normal range of motion. Cervical back: Normal range of motion and neck supple. Skin:     General: Skin is warm and dry. Neurological:      Mental Status: He is alert and oriented to person, place, and time. Cranial Nerves: No cranial nerve deficit. Sensory: No sensory deficit. Motor: No tremor, atrophy or abnormal muscle tone. Coordination: Coordination normal.      Gait: Gait normal.   Psychiatric:         Speech: Speech normal.         Behavior: Behavior normal.         Thought Content: Thought content normal.         Judgment: Judgment normal.         ASSESSMENT:     Diagnosis   1. Abdominal aortic aneurysm (AAA) without rupture (HCC) 4.85 cm x 4.51 cm   2. Iliac artery stenosis, left (HCC) - velocity 206.1 cm/s   3. Hypertension, unspecified type - stable, on lisinopril 30 mg, amlodipine 10 mg and metoprolol 25 mg   4. Tobacco abuse - smokes 1 PPD  The patient was instructed/counseled on smoking cessation. PATIENT EDUCATION focused on A&P of aneurysms and strong familial incidence. Patient was informed that smoking and high blood pressure can affect aneurysms. Smoking can thin vessels and high blood pressure results in too much pressure inside the vessel, which can potentially stretch it further. I explained that it is important to make family members aware of the influence of genetic factors in the development of an aneurysm. PLAN:      Return in about 6 months (around 10/22/2022) for AAA scan and office visit .

## 2024-03-07 NOTE — LETTER BODY
[Dear  ___] : Dear  [unfilled], [Consult Letter:] : I had the pleasure of evaluating your patient, [unfilled]. [Please see my note below.] : Please see my note below. [Consult Closing:] : Thank you very much for allowing me to participate in the care of this patient.  If you have any questions, please do not hesitate to contact me. [Sincerely,] : Sincerely, [FreeTextEntry3] : Shayy Cardona MD\par  Urologic Oncology\par  Robotic & Endoscopic urology\par  [facility] [place]\par  [facility]\par

## 2024-03-07 NOTE — ASSESSMENT
[FreeTextEntry1] : 76 year old male with elevated PSA of 11.40,free PSA 39%. recent catheterization post dual hip replacement. has recent unintentional weight loss of 11 lbs. No Family History of prostate cancer.  Had PBx 10 years ago: negative ex-smoker, no blood thinners. FU 11/7/22: PSA 11.20, Free PSA 40%. UA negative. MRI? negative for lesions - RW=263t - PSAD: 0.11 FU 11/23/22- Underwent Prostate biopsy on 11/15. Path all cores benign prostatic tissue  June 2023: PSA = 9 / US pelvis: post void residual is 42 mL. The prostate volume is 57 mL.   March 2024: office PVR = 16 mL  Plan:  PSA 6 months RTC 6 months for an office PVR

## 2024-04-23 ENCOUNTER — LABORATORY RESULT (OUTPATIENT)
Age: 79
End: 2024-04-23

## 2024-04-23 ENCOUNTER — APPOINTMENT (OUTPATIENT)
Dept: PULMONOLOGY | Facility: CLINIC | Age: 79
End: 2024-04-23
Payer: MEDICARE

## 2024-04-23 VITALS
BODY MASS INDEX: 25.16 KG/M2 | HEIGHT: 68 IN | OXYGEN SATURATION: 95 % | HEART RATE: 78 BPM | WEIGHT: 166 LBS | TEMPERATURE: 98 F | RESPIRATION RATE: 15 BRPM | SYSTOLIC BLOOD PRESSURE: 165 MMHG | DIASTOLIC BLOOD PRESSURE: 87 MMHG

## 2024-04-23 PROCEDURE — 36415 COLL VENOUS BLD VENIPUNCTURE: CPT

## 2024-04-23 PROCEDURE — 99213 OFFICE O/P EST LOW 20 MIN: CPT

## 2024-04-24 LAB
24R-OH-CALCIDIOL SERPL-MCNC: 53.9 PG/ML
ALBUMIN SERPL ELPH-MCNC: 4.7 G/DL
ALP BLD-CCNC: 72 U/L
ALT SERPL-CCNC: 28 U/L
ANION GAP SERPL CALC-SCNC: 14 MMOL/L
AST SERPL-CCNC: 25 U/L
BILIRUB SERPL-MCNC: 0.5 MG/DL
BUN SERPL-MCNC: 26 MG/DL
CALCIUM SERPL-MCNC: 10 MG/DL
CHLORIDE SERPL-SCNC: 102 MMOL/L
CO2 SERPL-SCNC: 22 MMOL/L
CREAT SERPL-MCNC: 1.1 MG/DL
EGFR: 69 ML/MIN/1.73M2
GLUCOSE SERPL-MCNC: 97 MG/DL
HCT VFR BLD CALC: 43.8 %
HGB BLD-MCNC: 14.3 G/DL
MCHC RBC-ENTMCNC: 29.9 PG
MCHC RBC-ENTMCNC: 32.6 GM/DL
MCV RBC AUTO: 91.4 FL
PLATELET # BLD AUTO: 432 K/UL
POTASSIUM SERPL-SCNC: 5.4 MMOL/L
PROT SERPL-MCNC: 7.5 G/DL
RBC # BLD: 4.79 M/UL
RBC # FLD: 14.3 %
SODIUM SERPL-SCNC: 138 MMOL/L
TSH SERPL-ACNC: 4.52 UIU/ML
VIT B12 SERPL-MCNC: 507 PG/ML
WBC # FLD AUTO: 13.21 K/UL

## 2024-04-27 NOTE — HISTORY OF PRESENT ILLNESS
[Former] : former [TextBox_4] : This letter  is regarding your patient  who  attended pulmonary out patient office today.  I have reviewed  patient's  past history, social history, family history and medication list. I also  reviewed nurse practitioners/ and fellows  notes and assessment and agree with it.  -The patient was referred by Dr Gong- had URI and CXR was c/w COPD- former smoker- quit 35yrs ago. Pt is asymptomatic ------------------------------No history of , fever, chills , rigors, chestpain, or hemoptysis. Questionable history of Raynauds phenomenon. No h/o significant weight loss in last few months. No history of liver dysfunction , collagen vascular disorder or chronic thromboembolic disease. I would classify her dyspnea as WHO  FUNCTIONAL CLASS II-------- ------ ----Pft date-------2/16/18 normal lung volumes-- ----Ct scan date--2/24/18 IMPRESSION: Right upper lobe dominant mixed solid and groundglass opacity  with other subcentimeter bilateral nodular opacities as above are likely  infectious etiology and represent pneumonia. Clinical correlation for  chronic mycobacterial avium complex infection is recommended given the  distribution of some of the tree-in-bud opacities. 1 to 3 month follow-up  chest CT is recommended to ensure resolution.   Coronary artery atherosclerotic disease.  --- -3/2018 here to review CT chest- he has no current resp complaints--------   10/2022 copd- cough - c/o wt loss   JAN 2023-----feels better cough is improved weight has stabilized--- seeing endocrinologist for hypothyroidism--   4/2023 no pulm complaints,  seeing endocrinologist for hypothyroidism and urology for enlarged prostate--NEEDS PFT - - -   10/2023 PFT normal lung volumes  4/2024 no pulm complaints  ----  endocrinologist for hypothyroidism and urology for enlarged prostate-- continues to grieve recent death of his wife

## 2024-04-27 NOTE — END OF VISIT
[Time Spent: ___ minutes] : I have spent [unfilled] minutes of time on the encounter. [FreeTextEntry3] :   I, Dr. Dixie Deluna  personally performed the evaluation and management (E/M) services for this established patient who presents today with (a) new problem(s)/exacerbation of (an) existing condition(s). That E/M includes conducting the clinically appropriate interval history &/or exam, assessing all new/exacerbated conditions, and establishing a new plan of care. Today, my RADHA, Rosa Maria Erwin NP, , was here to observe my evaluation and management service for this new problem/exacerbated condition and follow the plan of care established by me going forward.

## 2024-04-27 NOTE — DISCUSSION/SUMMARY
[FreeTextEntry1] : ------------Assessment plan---------- patient has been referred here for further opinion regarding pulmonary problem-------78yo referred for eval of ?COPD--BUT - PFT normal.---he has positive PAST   smoking--- - His CT chest is not consistent w/ COPD but he does have ground glass opacities and tree in bud opacities- repeat CT chest  annually labs in our office today f/u with urology and endocrinology-- needs bone density --MILD DRY cough due to postnasal drip use Flonase f/u in 6m   Thanks for allowing  me to participate  in the care of this patient.  Patient at this time  will follow  the above mentioned recommendations and return back for follow up visit. If you have any questions  I can be reached  at # [phone].  -  Dixie Deluna MD, [facility]

## 2024-07-11 ENCOUNTER — APPOINTMENT (OUTPATIENT)
Dept: ORTHOPEDIC SURGERY | Facility: CLINIC | Age: 79
End: 2024-07-11
Payer: MEDICARE

## 2024-07-11 DIAGNOSIS — S80.211A ABRASION, RIGHT KNEE, INITIAL ENCOUNTER: ICD-10-CM

## 2024-07-11 DIAGNOSIS — S80.01XA CONTUSION OF RIGHT KNEE, INITIAL ENCOUNTER: ICD-10-CM

## 2024-07-11 DIAGNOSIS — S80.212A ABRASION, LEFT KNEE, INITIAL ENCOUNTER: ICD-10-CM

## 2024-07-11 DIAGNOSIS — S80.02XA CONTUSION OF LEFT KNEE, INITIAL ENCOUNTER: ICD-10-CM

## 2024-07-11 PROCEDURE — 99214 OFFICE O/P EST MOD 30 MIN: CPT

## 2024-07-11 PROCEDURE — 73564 X-RAY EXAM KNEE 4 OR MORE: CPT | Mod: 50

## 2024-07-18 ENCOUNTER — APPOINTMENT (OUTPATIENT)
Dept: PULMONOLOGY | Facility: CLINIC | Age: 79
End: 2024-07-18
Payer: MEDICARE

## 2024-07-18 VITALS
TEMPERATURE: 98 F | SYSTOLIC BLOOD PRESSURE: 159 MMHG | OXYGEN SATURATION: 95 % | DIASTOLIC BLOOD PRESSURE: 84 MMHG | HEART RATE: 83 BPM | RESPIRATION RATE: 15 BRPM | HEIGHT: 68 IN | BODY MASS INDEX: 25.01 KG/M2 | WEIGHT: 165 LBS

## 2024-07-18 PROCEDURE — 99214 OFFICE O/P EST MOD 30 MIN: CPT

## 2024-08-08 ENCOUNTER — APPOINTMENT (OUTPATIENT)
Dept: UROLOGY | Facility: CLINIC | Age: 79
End: 2024-08-08

## 2024-08-08 PROCEDURE — 99214 OFFICE O/P EST MOD 30 MIN: CPT

## 2024-08-08 NOTE — LETTER BODY
[Dear  ___] : Dear  [unfilled], [Consult Letter:] : I had the pleasure of evaluating your patient, [unfilled]. [Please see my note below.] : Please see my note below. [Consult Closing:] : Thank you very much for allowing me to participate in the care of this patient.  If you have any questions, please do not hesitate to contact me. [Sincerely,] : Sincerely, [FreeTextEntry3] : Shayy Cardona MD\par  Urologic Oncology\par  Robotic & Endoscopic urology\par  [facility] [place]\par  [facility]\par

## 2024-08-08 NOTE — HISTORY OF PRESENT ILLNESS
[FreeTextEntry1] : 78-year-old male with recently diagnosed elevated PSA of 11.40 ng/ml, free PSA 39%. last PSA 5.2 around 10 years ago. recently underwent bilat hip replacement in the past 7 months. has been losing weight recently, unintentionally, 11 lbs. no bone pain. no hematuria.to note, after hip replacement, he had catheter placed. r/o inflamm elevation in PSA Denies family history of prostate cancer. Denies maternal history of breast cancer.  had prostate biopsy 10 years ago: negative per patient. IPSS = 0 / MAHNAZ = 3 ex-smoker: 10 years FU 11/7/22: PSA 11.20, Free PSA 40%. UA negative. MRI: negative for lesions - DK=429h - PSAD: 0.11 FU 11/23/22- Underwent Prostate biopsy on 11/15/2022. Path all cores benign prostatic tissue June 2023: PSA = 9 / US pelvis: post void residual is 42 mL. The prostate volume is 57 mL.  March 2024: office PVR = 16 mL August 2024: PSA=10.4 / %free=45% / PVR= 17 mL

## 2024-08-08 NOTE — ASSESSMENT
[FreeTextEntry1] : 76 year old male with elevated PSA of 11.40,free PSA 39%. recent catheterization post dual hip replacement. has recent unintentional weight loss of 11 lbs. No Family History of prostate cancer.  Had PBx 10 years ago: negative ex-smoker, no blood thinners. FU 11/7/22: PSA 11.20, Free PSA 40%. UA negative. MRI? negative for lesions - OY=537d - PSAD: 0.11 FU 11/23/22- Underwent Prostate biopsy on 11/15. Path all cores benign prostatic tissue  June 2023: PSA = 9 / US pelvis: post void residual is 42 mL. The prostate volume is 57 mL.   March 2024: office PVR = 16 mL August 2024: PSA=10.4 / %free=45% / PVR= 17 mL  Plan:  PSA 6 months RTC 6 months

## 2024-10-14 ENCOUNTER — APPOINTMENT (OUTPATIENT)
Dept: ORTHOPEDIC SURGERY | Facility: CLINIC | Age: 79
End: 2024-10-14
Payer: MEDICARE

## 2024-10-14 DIAGNOSIS — M76.892 OTHER SPECIFIED ENTHESOPATHIES OF LEFT LOWER LIMB, EXCLUDING FOOT: ICD-10-CM

## 2024-10-14 DIAGNOSIS — M25.561 PAIN IN RIGHT KNEE: ICD-10-CM

## 2024-10-14 DIAGNOSIS — Z96.641 PRESENCE OF RIGHT ARTIFICIAL HIP JOINT: ICD-10-CM

## 2024-10-14 DIAGNOSIS — M76.891 OTHER SPECIFIED ENTHESOPATHIES OF RIGHT LOWER LIMB, EXCLUDING FOOT: ICD-10-CM

## 2024-10-14 DIAGNOSIS — Z96.642 PRESENCE OF LEFT ARTIFICIAL HIP JOINT: ICD-10-CM

## 2024-10-14 DIAGNOSIS — M25.562 PAIN IN LEFT KNEE: ICD-10-CM

## 2024-10-14 PROCEDURE — 99214 OFFICE O/P EST MOD 30 MIN: CPT

## 2024-10-14 PROCEDURE — 73502 X-RAY EXAM HIP UNI 2-3 VIEWS: CPT

## 2024-10-15 RX ORDER — MELOXICAM 15 MG/1
15 TABLET ORAL DAILY
Qty: 30 | Refills: 2 | Status: ACTIVE | COMMUNITY
Start: 2024-10-15 | End: 1900-01-01

## 2024-11-14 ENCOUNTER — APPOINTMENT (OUTPATIENT)
Dept: PULMONOLOGY | Facility: CLINIC | Age: 79
End: 2024-11-14
Payer: MEDICARE

## 2024-11-14 VITALS
TEMPERATURE: 97.3 F | HEART RATE: 70 BPM | OXYGEN SATURATION: 94 % | SYSTOLIC BLOOD PRESSURE: 146 MMHG | RESPIRATION RATE: 15 BRPM | DIASTOLIC BLOOD PRESSURE: 84 MMHG

## 2024-11-14 DIAGNOSIS — N40.0 BENIGN PROSTATIC HYPERPLASIA WITHOUT LOWER URINARY TRACT SYMPMS: ICD-10-CM

## 2024-11-14 DIAGNOSIS — R05.9 COUGH, UNSPECIFIED: ICD-10-CM

## 2024-11-14 DIAGNOSIS — R97.20 BENIGN PROSTATIC HYPERPLASIA WITHOUT LOWER URINARY TRACT SYMPMS: ICD-10-CM

## 2024-11-14 DIAGNOSIS — E03.9 HYPOTHYROIDISM, UNSPECIFIED: ICD-10-CM

## 2024-11-14 DIAGNOSIS — R91.8 OTHER NONSPECIFIC ABNORMAL FINDING OF LUNG FIELD: ICD-10-CM

## 2024-11-14 PROCEDURE — 36415 COLL VENOUS BLD VENIPUNCTURE: CPT

## 2024-11-14 PROCEDURE — 99214 OFFICE O/P EST MOD 30 MIN: CPT | Mod: 25

## 2024-11-14 PROCEDURE — G0008: CPT

## 2024-11-14 PROCEDURE — 90662 IIV NO PRSV INCREASED AG IM: CPT

## 2024-11-15 LAB
ALBUMIN SERPL ELPH-MCNC: 4.5 G/DL
ALP BLD-CCNC: 72 U/L
ALT SERPL-CCNC: 19 U/L
ANION GAP SERPL CALC-SCNC: 11 MMOL/L
AST SERPL-CCNC: 28 U/L
BASOPHILS # BLD AUTO: 0.06 K/UL
BASOPHILS NFR BLD AUTO: 0.8 %
BILIRUB SERPL-MCNC: 0.4 MG/DL
BUN SERPL-MCNC: 16 MG/DL
CALCIUM SERPL-MCNC: 9.9 MG/DL
CHLORIDE SERPL-SCNC: 105 MMOL/L
CO2 SERPL-SCNC: 24 MMOL/L
CREAT SERPL-MCNC: 1.05 MG/DL
EGFR: 72 ML/MIN/1.73M2
EOSINOPHIL # BLD AUTO: 0.18 K/UL
EOSINOPHIL NFR BLD AUTO: 2.5 %
ESTIMATED AVERAGE GLUCOSE: 123 MG/DL
GLUCOSE SERPL-MCNC: 94 MG/DL
HBA1C MFR BLD HPLC: 5.9 %
HCT VFR BLD CALC: 42.5 %
HGB BLD-MCNC: 13.6 G/DL
IMM GRANULOCYTES NFR BLD AUTO: 0.7 %
LYMPHOCYTES # BLD AUTO: 1.42 K/UL
LYMPHOCYTES NFR BLD AUTO: 20.1 %
MAN DIFF?: NORMAL
MCHC RBC-ENTMCNC: 30 PG
MCHC RBC-ENTMCNC: 32 G/DL
MCV RBC AUTO: 93.8 FL
MONOCYTES # BLD AUTO: 0.62 K/UL
MONOCYTES NFR BLD AUTO: 8.8 %
NEUTROPHILS # BLD AUTO: 4.74 K/UL
NEUTROPHILS NFR BLD AUTO: 67.1 %
NT-PROBNP SERPL-MCNC: <36 PG/ML
PLATELET # BLD AUTO: 391 K/UL
POTASSIUM SERPL-SCNC: 5 MMOL/L
PROT SERPL-MCNC: 7.3 G/DL
RBC # BLD: 4.53 M/UL
RBC # FLD: 14.2 %
SODIUM SERPL-SCNC: 141 MMOL/L
TSH SERPL-ACNC: 0.94 UIU/ML
WBC # FLD AUTO: 7.07 K/UL

## 2024-11-18 LAB
PSA FREE FLD-MCNC: 47 %
PSA FREE SERPL-MCNC: 4.57 NG/ML
PSA SERPL-MCNC: 9.64 NG/ML

## 2024-11-20 LAB
CHOLEST SERPL-MCNC: 149 MG/DL
HDLC SERPL-MCNC: 44 MG/DL
LDLC SERPL CALC-MCNC: 81 MG/DL
NONHDLC SERPL-MCNC: 105 MG/DL
TRIGL SERPL-MCNC: 138 MG/DL

## 2025-02-06 ENCOUNTER — APPOINTMENT (OUTPATIENT)
Dept: UROLOGY | Facility: CLINIC | Age: 80
End: 2025-02-06

## 2025-02-07 ENCOUNTER — APPOINTMENT (OUTPATIENT)
Dept: RADIOLOGY | Facility: CLINIC | Age: 80
End: 2025-02-07

## 2025-02-07 ENCOUNTER — OUTPATIENT (OUTPATIENT)
Dept: OUTPATIENT SERVICES | Facility: HOSPITAL | Age: 80
LOS: 1 days | End: 2025-02-07
Payer: MEDICARE

## 2025-02-07 ENCOUNTER — APPOINTMENT (OUTPATIENT)
Dept: CT IMAGING | Facility: CLINIC | Age: 80
End: 2025-02-07

## 2025-02-07 DIAGNOSIS — R05.9 COUGH, UNSPECIFIED: ICD-10-CM

## 2025-02-07 PROCEDURE — 77080 DXA BONE DENSITY AXIAL: CPT

## 2025-02-07 PROCEDURE — 71250 CT THORAX DX C-: CPT | Mod: 26

## 2025-02-07 PROCEDURE — 71250 CT THORAX DX C-: CPT

## 2025-02-07 PROCEDURE — 77080 DXA BONE DENSITY AXIAL: CPT | Mod: 26

## 2025-02-14 ENCOUNTER — APPOINTMENT (OUTPATIENT)
Dept: UROLOGY | Facility: CLINIC | Age: 80
End: 2025-02-14
Payer: MEDICARE

## 2025-02-14 VITALS
WEIGHT: 164 LBS | HEIGHT: 68 IN | SYSTOLIC BLOOD PRESSURE: 116 MMHG | DIASTOLIC BLOOD PRESSURE: 70 MMHG | BODY MASS INDEX: 24.86 KG/M2

## 2025-02-14 DIAGNOSIS — R97.20 BENIGN PROSTATIC HYPERPLASIA WITHOUT LOWER URINARY TRACT SYMPMS: ICD-10-CM

## 2025-02-14 DIAGNOSIS — N40.0 BENIGN PROSTATIC HYPERPLASIA WITHOUT LOWER URINARY TRACT SYMPMS: ICD-10-CM

## 2025-02-14 DIAGNOSIS — R97.20 ELEVATED PROSTATE, SPECIFIC ANTIGEN [PSA]: ICD-10-CM

## 2025-02-14 PROCEDURE — 99213 OFFICE O/P EST LOW 20 MIN: CPT

## 2025-02-19 ENCOUNTER — APPOINTMENT (OUTPATIENT)
Dept: PULMONOLOGY | Facility: CLINIC | Age: 80
End: 2025-02-19
Payer: MEDICARE

## 2025-02-19 VITALS
BODY MASS INDEX: 23.77 KG/M2 | OXYGEN SATURATION: 96 % | WEIGHT: 166 LBS | RESPIRATION RATE: 15 BRPM | HEART RATE: 76 BPM | HEIGHT: 70 IN | SYSTOLIC BLOOD PRESSURE: 129 MMHG | DIASTOLIC BLOOD PRESSURE: 77 MMHG | TEMPERATURE: 98 F

## 2025-02-19 PROCEDURE — 99214 OFFICE O/P EST MOD 30 MIN: CPT

## 2025-08-12 ENCOUNTER — APPOINTMENT (OUTPATIENT)
Dept: PULMONOLOGY | Facility: CLINIC | Age: 80
End: 2025-08-12
Payer: MEDICARE

## 2025-08-12 VITALS
WEIGHT: 163 LBS | RESPIRATION RATE: 18 BRPM | DIASTOLIC BLOOD PRESSURE: 78 MMHG | HEIGHT: 68 IN | HEART RATE: 71 BPM | OXYGEN SATURATION: 96 % | BODY MASS INDEX: 24.71 KG/M2 | SYSTOLIC BLOOD PRESSURE: 132 MMHG

## 2025-08-12 PROCEDURE — 94726 PLETHYSMOGRAPHY LUNG VOLUMES: CPT

## 2025-08-12 PROCEDURE — ZZZZZ: CPT

## 2025-08-12 PROCEDURE — 99214 OFFICE O/P EST MOD 30 MIN: CPT | Mod: 25

## 2025-08-12 PROCEDURE — 94729 DIFFUSING CAPACITY: CPT

## 2025-08-12 PROCEDURE — 94010 BREATHING CAPACITY TEST: CPT
